# Patient Record
Sex: MALE | Race: WHITE | Employment: OTHER | URBAN - METROPOLITAN AREA
[De-identification: names, ages, dates, MRNs, and addresses within clinical notes are randomized per-mention and may not be internally consistent; named-entity substitution may affect disease eponyms.]

---

## 2019-05-14 ENCOUNTER — HOSPITAL ENCOUNTER (OUTPATIENT)
Dept: GENERAL RADIOLOGY | Facility: HOSPITAL | Age: 69
Discharge: HOME | End: 2019-05-14
Attending: INTERNAL MEDICINE
Payer: MEDICARE

## 2019-05-14 DIAGNOSIS — I10 ESSENTIAL HYPERTENSION: ICD-10-CM

## 2019-05-14 DIAGNOSIS — F03.90 DEMENTIA WITHOUT BEHAVIORAL DISTURBANCE, UNSPECIFIED DEMENTIA TYPE (HCC): ICD-10-CM

## 2019-05-14 DIAGNOSIS — R13.10 DYSPHAGIA, UNSPECIFIED TYPE: ICD-10-CM

## 2019-05-14 PROCEDURE — 92611 MOTION FLUOROSCOPY/SWALLOW: CPT

## 2019-05-14 PROCEDURE — 74230 X-RAY XM SWLNG FUNCJ C+: CPT | Performed by: INTERNAL MEDICINE

## 2019-05-14 NOTE — PROGRESS NOTES
ADULT VIDEOFLUOROSCOPIC SWALLOWING STUDY    Admission Date: 5/14/2019  Evaluation Date: 05/14/19  Radiologist: Adela Barron   Diet Recommendations - Solids: Regular  Diet Recommendations - Liquid:  Thin    Further Follow-up:        Compensatory traits. During the interview, the patient answered questions after a delay and with very brief answers. There was reduced eye contact. CARMELINA did not report any acute change in mental status, just ongoing memory issues.   He is a slow eater and always has b rate;Small bites and sips  Effectiveness: Yes     HARD SOLID  Oral Phase of Swallow (VFSS - Hard Solid): Impaired  Bolus Retrieval (VFSS - Hard Solid): Intact  Bilabial Seal (VFSS - Hard Solid): Intact  Bolus Formation (VFSS - Hard Solid):  Impaired  Bolus COMMUNICATION  Reviewed results with Radiologist; agreement verbalized.     Patient Experiencing Pain: No                FOLLOW UP  Treatment Plan/Recommendations: (consider OP cognitive eval if felt not to be at baseline)     Thank you for your referral.

## 2019-07-25 PROBLEM — Z85.038 PERSONAL HISTORY OF COLON CANCER: Status: ACTIVE | Noted: 2019-07-25

## 2019-07-25 PROBLEM — Z86.73 HISTORY OF STROKE IN ADULTHOOD: Status: ACTIVE | Noted: 2019-07-25

## 2019-07-25 PROBLEM — Z90.49: Status: ACTIVE | Noted: 2019-07-25

## 2019-09-09 PROCEDURE — 88305 TISSUE EXAM BY PATHOLOGIST: CPT | Performed by: INTERNAL MEDICINE

## 2020-08-16 ENCOUNTER — APPOINTMENT (OUTPATIENT)
Dept: ULTRASOUND IMAGING | Age: 70
DRG: 629 | End: 2020-08-16
Attending: EMERGENCY MEDICINE

## 2020-08-16 ENCOUNTER — HOSPITAL ENCOUNTER (INPATIENT)
Age: 70
LOS: 9 days | Discharge: NURSING, REHAB OR LONG-TERM CARE | DRG: 629 | End: 2020-08-25
Attending: EMERGENCY MEDICINE | Admitting: INTERNAL MEDICINE

## 2020-08-16 ENCOUNTER — APPOINTMENT (OUTPATIENT)
Dept: GENERAL RADIOLOGY | Age: 70
DRG: 629 | End: 2020-08-16
Attending: EMERGENCY MEDICINE

## 2020-08-16 DIAGNOSIS — T14.8XXA WOUND INFECTION: Primary | ICD-10-CM

## 2020-08-16 DIAGNOSIS — L08.9 WOUND INFECTION: Primary | ICD-10-CM

## 2020-08-16 DIAGNOSIS — S91.302A OPEN WOUND OF LEFT FOOT, INITIAL ENCOUNTER: ICD-10-CM

## 2020-08-16 PROBLEM — I10 ESSENTIAL HYPERTENSION: Status: ACTIVE | Noted: 2020-08-16

## 2020-08-16 PROBLEM — F03.90 DEMENTIA (CMD): Status: ACTIVE | Noted: 2020-08-16

## 2020-08-16 PROBLEM — N40.0 BPH (BENIGN PROSTATIC HYPERPLASIA): Status: ACTIVE | Noted: 2020-08-16

## 2020-08-16 LAB
ALBUMIN SERPL-MCNC: 3.1 G/DL (ref 3.6–5.1)
ALBUMIN/GLOB SERPL: 0.8 {RATIO} (ref 1–2.4)
ALP SERPL-CCNC: 66 UNITS/L (ref 45–117)
ALT SERPL-CCNC: 17 UNITS/L
ANION GAP SERPL CALC-SCNC: 7 MMOL/L (ref 10–20)
APTT PPP: 28 SEC (ref 22–32)
AST SERPL-CCNC: 13 UNITS/L
BASOPHILS # BLD: 0.1 K/MCL (ref 0–0.3)
BASOPHILS NFR BLD: 1 %
BILIRUB SERPL-MCNC: 0.3 MG/DL (ref 0.2–1)
BUN SERPL-MCNC: 22 MG/DL (ref 6–20)
BUN/CREAT SERPL: 21 (ref 7–25)
CALCIUM SERPL-MCNC: 8.9 MG/DL (ref 8.4–10.2)
CHLORIDE SERPL-SCNC: 110 MMOL/L (ref 98–107)
CO2 SERPL-SCNC: 30 MMOL/L (ref 21–32)
CREAT SERPL-MCNC: 1.03 MG/DL (ref 0.67–1.17)
DIFFERENTIAL METHOD BLD: ABNORMAL
EOSINOPHIL # BLD: 0.6 K/MCL (ref 0.1–0.5)
EOSINOPHIL NFR BLD: 8 %
ERYTHROCYTE [DISTWIDTH] IN BLOOD: 13.9 % (ref 11–15)
GLOBULIN SER-MCNC: 3.8 G/DL (ref 2–4)
GLUCOSE SERPL-MCNC: 85 MG/DL (ref 65–99)
HCT VFR BLD CALC: 36.6 % (ref 39–51)
HGB BLD-MCNC: 12.1 G/DL (ref 13–17)
IMM GRANULOCYTES # BLD AUTO: 0 K/MCL (ref 0–0.2)
IMM GRANULOCYTES NFR BLD: 0 %
INR PPP: 1.1
LACTATE BLDV-MCNC: 1.2 MMOL/L
LYMPHOCYTES # BLD: 2.7 K/MCL (ref 1–4)
LYMPHOCYTES NFR BLD: 37 %
MAGNESIUM SERPL-MCNC: 2.2 MG/DL (ref 1.7–2.4)
MCH RBC QN AUTO: 28.7 PG (ref 26–34)
MCHC RBC AUTO-ENTMCNC: 33.1 G/DL (ref 32–36.5)
MCV RBC AUTO: 86.9 FL (ref 78–100)
MONOCYTES # BLD: 0.4 K/MCL (ref 0.3–0.9)
MONOCYTES NFR BLD: 6 %
NEUTROPHILS # BLD: 3.3 K/MCL (ref 1.8–7.7)
NEUTROPHILS NFR BLD: 48 %
NRBC BLD MANUAL-RTO: 0 /100 WBC
PLATELET # BLD: 161 K/MCL (ref 140–450)
POTASSIUM SERPL-SCNC: 3.4 MMOL/L (ref 3.4–5.1)
PROT SERPL-MCNC: 6.9 G/DL (ref 6.4–8.2)
PROTHROMBIN TIME: 12 SEC (ref 9.7–11.8)
RBC # BLD: 4.21 MIL/MCL (ref 4.5–5.9)
SODIUM SERPL-SCNC: 144 MMOL/L (ref 135–145)
WBC # BLD: 7.1 K/MCL (ref 4.2–11)

## 2020-08-16 PROCEDURE — 93971 EXTREMITY STUDY: CPT

## 2020-08-16 PROCEDURE — 99223 1ST HOSP IP/OBS HIGH 75: CPT | Performed by: INTERNAL MEDICINE

## 2020-08-16 PROCEDURE — 87040 BLOOD CULTURE FOR BACTERIA: CPT

## 2020-08-16 PROCEDURE — 85025 COMPLETE CBC W/AUTO DIFF WBC: CPT

## 2020-08-16 PROCEDURE — 10004281 HB COUNTER-STAFF TIME PER 15 MIN

## 2020-08-16 PROCEDURE — 10000002 HB ROOM CHARGE MED SURG

## 2020-08-16 PROCEDURE — 10002807 HB RX 258: Performed by: EMERGENCY MEDICINE

## 2020-08-16 PROCEDURE — 73630 X-RAY EXAM OF FOOT: CPT

## 2020-08-16 PROCEDURE — 96374 THER/PROPH/DIAG INJ IV PUSH: CPT

## 2020-08-16 PROCEDURE — 83735 ASSAY OF MAGNESIUM: CPT

## 2020-08-16 PROCEDURE — 99285 EMERGENCY DEPT VISIT HI MDM: CPT

## 2020-08-16 PROCEDURE — 83605 ASSAY OF LACTIC ACID: CPT

## 2020-08-16 PROCEDURE — 10002800 HB RX 250 W HCPCS: Performed by: EMERGENCY MEDICINE

## 2020-08-16 PROCEDURE — 85610 PROTHROMBIN TIME: CPT

## 2020-08-16 PROCEDURE — 80053 COMPREHEN METABOLIC PANEL: CPT

## 2020-08-16 PROCEDURE — 85730 THROMBOPLASTIN TIME PARTIAL: CPT

## 2020-08-16 PROCEDURE — 87205 SMEAR GRAM STAIN: CPT

## 2020-08-16 RX ORDER — TAMSULOSIN HYDROCHLORIDE 0.4 MG/1
0.4 CAPSULE ORAL
Status: DISCONTINUED | OUTPATIENT
Start: 2020-08-17 | End: 2020-08-25 | Stop reason: HOSPADM

## 2020-08-16 RX ORDER — DONEPEZIL HYDROCHLORIDE 10 MG/1
10 TABLET, FILM COATED ORAL NIGHTLY
COMMUNITY
End: 2020-08-16 | Stop reason: CLARIF

## 2020-08-16 RX ORDER — PROCHLORPERAZINE MALEATE 5 MG/1
5 TABLET ORAL EVERY 4 HOURS PRN
Status: DISCONTINUED | OUTPATIENT
Start: 2020-08-16 | End: 2020-08-25 | Stop reason: HOSPADM

## 2020-08-16 RX ORDER — POTASSIUM CHLORIDE 20 MEQ/1
20 TABLET, EXTENDED RELEASE ORAL EVERY 4 HOURS PRN
Status: DISCONTINUED | OUTPATIENT
Start: 2020-08-16 | End: 2020-08-25 | Stop reason: HOSPADM

## 2020-08-16 RX ORDER — POTASSIUM CHLORIDE 14.9 MG/ML
20 INJECTION INTRAVENOUS EVERY 4 HOURS PRN
Status: DISCONTINUED | OUTPATIENT
Start: 2020-08-16 | End: 2020-08-25 | Stop reason: HOSPADM

## 2020-08-16 RX ORDER — ASPIRIN 81 MG/1
81 TABLET, CHEWABLE ORAL DAILY
Status: DISCONTINUED | OUTPATIENT
Start: 2020-08-17 | End: 2020-08-25 | Stop reason: HOSPADM

## 2020-08-16 RX ORDER — METOPROLOL SUCCINATE 50 MG/1
50 TABLET, EXTENDED RELEASE ORAL DAILY
COMMUNITY

## 2020-08-16 RX ORDER — ASPIRIN 81 MG/1
81 TABLET, CHEWABLE ORAL DAILY
COMMUNITY

## 2020-08-16 RX ORDER — ONDANSETRON 2 MG/ML
4 INJECTION INTRAMUSCULAR; INTRAVENOUS 2 TIMES DAILY PRN
Status: DISCONTINUED | OUTPATIENT
Start: 2020-08-16 | End: 2020-08-20 | Stop reason: SDUPTHER

## 2020-08-16 RX ORDER — MAGNESIUM SULFATE 1 G/100ML
1 INJECTION INTRAVENOUS DAILY PRN
Status: DISCONTINUED | OUTPATIENT
Start: 2020-08-16 | End: 2020-08-25 | Stop reason: HOSPADM

## 2020-08-16 RX ORDER — AMLODIPINE BESYLATE 5 MG/1
5 TABLET ORAL DAILY
COMMUNITY
End: 2020-09-03

## 2020-08-16 RX ORDER — FUROSEMIDE 40 MG/1
40 TABLET ORAL DAILY
COMMUNITY

## 2020-08-16 RX ORDER — ACETAMINOPHEN 325 MG/1
650 TABLET ORAL EVERY 4 HOURS PRN
Status: DISCONTINUED | OUTPATIENT
Start: 2020-08-16 | End: 2020-08-25 | Stop reason: HOSPADM

## 2020-08-16 RX ORDER — METOPROLOL SUCCINATE 50 MG/1
50 TABLET, EXTENDED RELEASE ORAL DAILY
Status: DISCONTINUED | OUTPATIENT
Start: 2020-08-17 | End: 2020-08-25 | Stop reason: HOSPADM

## 2020-08-16 RX ORDER — ERGOCALCIFEROL 1.25 MG/1
1.25 CAPSULE ORAL
Status: DISCONTINUED | OUTPATIENT
Start: 2020-08-19 | End: 2020-08-25 | Stop reason: HOSPADM

## 2020-08-16 RX ORDER — POTASSIUM CHLORIDE 20 MEQ/1
40 TABLET, EXTENDED RELEASE ORAL EVERY 4 HOURS PRN
Status: DISCONTINUED | OUTPATIENT
Start: 2020-08-16 | End: 2020-08-25 | Stop reason: HOSPADM

## 2020-08-16 RX ORDER — DONEPEZIL HYDROCHLORIDE 10 MG/1
10 TABLET, FILM COATED ORAL NIGHTLY
Status: DISCONTINUED | OUTPATIENT
Start: 2020-08-17 | End: 2020-08-25 | Stop reason: HOSPADM

## 2020-08-16 RX ORDER — 0.9 % SODIUM CHLORIDE 0.9 %
2 VIAL (ML) INJECTION EVERY 12 HOURS SCHEDULED
Status: DISCONTINUED | OUTPATIENT
Start: 2020-08-17 | End: 2020-08-25 | Stop reason: HOSPADM

## 2020-08-16 RX ORDER — AMLODIPINE BESYLATE 5 MG/1
5 TABLET ORAL DAILY
Status: DISCONTINUED | OUTPATIENT
Start: 2020-08-17 | End: 2020-08-25 | Stop reason: HOSPADM

## 2020-08-16 RX ORDER — FUROSEMIDE 40 MG/1
40 TABLET ORAL DAILY
Status: DISCONTINUED | OUTPATIENT
Start: 2020-08-17 | End: 2020-08-25 | Stop reason: HOSPADM

## 2020-08-16 RX ORDER — ERGOCALCIFEROL 1.25 MG/1
1.25 CAPSULE ORAL
COMMUNITY

## 2020-08-16 RX ORDER — MULTIVIT,CALC,MINS/IRON/FOLIC 9MG-400MCG
1 TABLET ORAL DAILY
COMMUNITY

## 2020-08-16 RX ORDER — ENOXAPARIN SODIUM 100 MG/ML
40 INJECTION SUBCUTANEOUS DAILY
Status: DISCONTINUED | OUTPATIENT
Start: 2020-08-17 | End: 2020-08-25 | Stop reason: HOSPADM

## 2020-08-16 RX ORDER — POTASSIUM CHLORIDE 1.5 G/1.58G
20 POWDER, FOR SOLUTION ORAL EVERY 4 HOURS PRN
Status: DISCONTINUED | OUTPATIENT
Start: 2020-08-16 | End: 2020-08-25 | Stop reason: HOSPADM

## 2020-08-16 RX ORDER — POTASSIUM CHLORIDE 1.5 G/1.58G
40 POWDER, FOR SOLUTION ORAL EVERY 4 HOURS PRN
Status: DISCONTINUED | OUTPATIENT
Start: 2020-08-16 | End: 2020-08-25 | Stop reason: HOSPADM

## 2020-08-16 RX ORDER — TAMSULOSIN HYDROCHLORIDE 0.4 MG/1
0.4 CAPSULE ORAL
COMMUNITY

## 2020-08-16 RX ORDER — DONEPEZIL HYDROCHLORIDE 10 MG/1
10 TABLET, FILM COATED ORAL NIGHTLY
COMMUNITY

## 2020-08-16 RX ADMIN — VANCOMYCIN HYDROCHLORIDE 750 MG: 750 INJECTION, POWDER, LYOPHILIZED, FOR SOLUTION INTRAVENOUS at 20:19

## 2020-08-16 ASSESSMENT — ENCOUNTER SYMPTOMS
EYE PAIN: 0
COUGH: 0
DIZZINESS: 0
ABDOMINAL PAIN: 0
FEVER: 0
BACK PAIN: 0
AGITATION: 0

## 2020-08-16 ASSESSMENT — ACTIVITIES OF DAILY LIVING (ADL)
ADL_BEFORE_ADMISSION: INDEPENDENT
ADL_SHORT_OF_BREATH: NO
CHRONIC_PAIN_PRESENT: NO
RECENT_DECLINE_ADL: NO
ADL_SCORE: 12

## 2020-08-16 ASSESSMENT — LIFESTYLE VARIABLES
HOW OFTEN DO YOU HAVE A DRINK CONTAINING ALCOHOL: NEVER
HOW MANY STANDARD DRINKS CONTAINING ALCOHOL DO YOU HAVE ON A TYPICAL DAY: 0,1 OR 2
AUDIT-C TOTAL SCORE: 0
HOW OFTEN DO YOU HAVE 6 OR MORE DRINKS ON ONE OCCASION: NEVER
ALCOHOL_USE_STATUS: NO OR LOW RISK WITH VALIDATED TOOL

## 2020-08-16 ASSESSMENT — COGNITIVE AND FUNCTIONAL STATUS - GENERAL
ARE YOU BLIND OR DO YOU HAVE SERIOUS DIFFICULTY SEEING, EVEN WHEN WEARING GLASSES: NO
ARE YOU DEAF OR DO YOU HAVE SERIOUS DIFFICULTY  HEARING: NO

## 2020-08-16 ASSESSMENT — PAIN SCALES - GENERAL: PAINLEVEL_OUTOF10: 0

## 2020-08-16 ASSESSMENT — COLUMBIA-SUICIDE SEVERITY RATING SCALE - C-SSRS
1. WITHIN THE PAST MONTH, HAVE YOU WISHED YOU WERE DEAD OR WISHED YOU COULD GO TO SLEEP AND NOT WAKE UP?: NO
6. HAVE YOU EVER DONE ANYTHING, STARTED TO DO ANYTHING, OR PREPARED TO DO ANYTHING TO END YOUR LIFE?: NO
2. HAVE YOU ACTUALLY HAD ANY THOUGHTS OF KILLING YOURSELF?: NO
IS THE PATIENT ABLE TO COMPLETE C-SSRS: YES

## 2020-08-16 ASSESSMENT — PATIENT HEALTH QUESTIONNAIRE - PHQ9: IS PATIENT ABLE TO COMPLETE PHQ2 OR PHQ9: YES

## 2020-08-17 ENCOUNTER — ADVANCED DIRECTIVES (OUTPATIENT)
Dept: HEALTH INFORMATION MANAGEMENT | Age: 70
End: 2020-08-17

## 2020-08-17 ENCOUNTER — APPOINTMENT (OUTPATIENT)
Dept: MRI IMAGING | Age: 70
DRG: 629 | End: 2020-08-17
Attending: PODIATRIST

## 2020-08-17 LAB
ANION GAP SERPL CALC-SCNC: 6 MMOL/L (ref 10–20)
BASOPHILS # BLD: 0.1 K/MCL (ref 0–0.3)
BASOPHILS NFR BLD: 1 %
BUN SERPL-MCNC: 17 MG/DL (ref 6–20)
BUN/CREAT SERPL: 19 (ref 7–25)
CALCIUM SERPL-MCNC: 8.8 MG/DL (ref 8.4–10.2)
CHLORIDE SERPL-SCNC: 108 MMOL/L (ref 98–107)
CO2 SERPL-SCNC: 31 MMOL/L (ref 21–32)
CREAT SERPL-MCNC: 0.91 MG/DL (ref 0.67–1.17)
DIFFERENTIAL METHOD BLD: NORMAL
EOSINOPHIL # BLD: 0.5 K/MCL (ref 0.1–0.5)
EOSINOPHIL NFR BLD: 7 %
ERYTHROCYTE [DISTWIDTH] IN BLOOD: 13.7 % (ref 11–15)
ERYTHROCYTE [SEDIMENTATION RATE] IN BLOOD BY WESTERGREN METHOD: 25 MM/HR (ref 0–20)
GLUCOSE SERPL-MCNC: 89 MG/DL (ref 65–99)
HCT VFR BLD CALC: 39.3 % (ref 39–51)
HGB BLD-MCNC: 13.2 G/DL (ref 13–17)
IMM GRANULOCYTES # BLD AUTO: 0 K/MCL (ref 0–0.2)
IMM GRANULOCYTES NFR BLD: 0 %
LYMPHOCYTES # BLD: 2.1 K/MCL (ref 1–4)
LYMPHOCYTES NFR BLD: 26 %
MCH RBC QN AUTO: 28.8 PG (ref 26–34)
MCHC RBC AUTO-ENTMCNC: 33.6 G/DL (ref 32–36.5)
MCV RBC AUTO: 85.8 FL (ref 78–100)
MONOCYTES # BLD: 0.5 K/MCL (ref 0.3–0.9)
MONOCYTES NFR BLD: 6 %
NEUTROPHILS # BLD: 4.8 K/MCL (ref 1.8–7.7)
NEUTROPHILS NFR BLD: 60 %
NRBC BLD MANUAL-RTO: 0 /100 WBC
PLATELET # BLD: 160 K/MCL (ref 140–450)
POTASSIUM SERPL-SCNC: 3.4 MMOL/L (ref 3.4–5.1)
RBC # BLD: 4.58 MIL/MCL (ref 4.5–5.9)
SODIUM SERPL-SCNC: 142 MMOL/L (ref 135–145)
WBC # BLD: 8 K/MCL (ref 4.2–11)

## 2020-08-17 PROCEDURE — 99233 SBSQ HOSP IP/OBS HIGH 50: CPT | Performed by: INTERNAL MEDICINE

## 2020-08-17 PROCEDURE — 85652 RBC SED RATE AUTOMATED: CPT

## 2020-08-17 PROCEDURE — 80048 BASIC METABOLIC PNL TOTAL CA: CPT

## 2020-08-17 PROCEDURE — 10004651 HB RX, NO CHARGE ITEM: Performed by: INTERNAL MEDICINE

## 2020-08-17 PROCEDURE — 10002803 HB RX 637: Performed by: INTERNAL MEDICINE

## 2020-08-17 PROCEDURE — A9577 INJ MULTIHANCE: HCPCS | Performed by: PODIATRIST

## 2020-08-17 PROCEDURE — 10002805 HB CONTRAST AGENT: Performed by: PODIATRIST

## 2020-08-17 PROCEDURE — 10002800 HB RX 250 W HCPCS: Performed by: INTERNAL MEDICINE

## 2020-08-17 PROCEDURE — 10000002 HB ROOM CHARGE MED SURG

## 2020-08-17 PROCEDURE — 73720 MRI LWR EXTREMITY W/O&W/DYE: CPT

## 2020-08-17 PROCEDURE — 86141 C-REACTIVE PROTEIN HS: CPT

## 2020-08-17 PROCEDURE — 85025 COMPLETE CBC W/AUTO DIFF WBC: CPT

## 2020-08-17 PROCEDURE — 36415 COLL VENOUS BLD VENIPUNCTURE: CPT

## 2020-08-17 PROCEDURE — 10002807 HB RX 258: Performed by: INTERNAL MEDICINE

## 2020-08-17 RX ORDER — HYDRALAZINE HYDROCHLORIDE 20 MG/ML
20 INJECTION INTRAMUSCULAR; INTRAVENOUS EVERY 6 HOURS PRN
Status: DISCONTINUED | OUTPATIENT
Start: 2020-08-17 | End: 2020-08-20 | Stop reason: SDUPTHER

## 2020-08-17 RX ADMIN — VANCOMYCIN HYDROCHLORIDE 750 MG: 750 INJECTION, POWDER, LYOPHILIZED, FOR SOLUTION INTRAVENOUS at 20:55

## 2020-08-17 RX ADMIN — AMLODIPINE BESYLATE 5 MG: 5 TABLET ORAL at 09:48

## 2020-08-17 RX ADMIN — ENOXAPARIN SODIUM 40 MG: 40 INJECTION SUBCUTANEOUS at 09:48

## 2020-08-17 RX ADMIN — HYDRALAZINE HYDROCHLORIDE 20 MG: 20 INJECTION INTRAMUSCULAR; INTRAVENOUS at 17:41

## 2020-08-17 RX ADMIN — ASPIRIN 81 MG 81 MG: 81 TABLET ORAL at 09:50

## 2020-08-17 RX ADMIN — SODIUM CHLORIDE, PRESERVATIVE FREE 2 ML: 5 INJECTION INTRAVENOUS at 20:51

## 2020-08-17 RX ADMIN — GADOBENATE DIMEGLUMINE 10 ML: 529 INJECTION, SOLUTION INTRAVENOUS at 14:00

## 2020-08-17 RX ADMIN — DONEPEZIL HYDROCHLORIDE 10 MG: 10 TABLET ORAL at 20:51

## 2020-08-17 RX ADMIN — SODIUM CHLORIDE 50 ML: 0.9 INJECTION, SOLUTION INTRAVENOUS at 10:37

## 2020-08-17 RX ADMIN — VANCOMYCIN HYDROCHLORIDE 750 MG: 750 INJECTION, POWDER, LYOPHILIZED, FOR SOLUTION INTRAVENOUS at 10:40

## 2020-08-17 RX ADMIN — TAMSULOSIN HYDROCHLORIDE 0.4 MG: 0.4 CAPSULE ORAL at 09:47

## 2020-08-17 RX ADMIN — FUROSEMIDE 40 MG: 40 TABLET ORAL at 09:47

## 2020-08-17 RX ADMIN — SODIUM CHLORIDE, PRESERVATIVE FREE 2 ML: 5 INJECTION INTRAVENOUS at 09:50

## 2020-08-17 RX ADMIN — METOPROLOL SUCCINATE 50 MG: 50 TABLET, EXTENDED RELEASE ORAL at 09:47

## 2020-08-17 ASSESSMENT — ENCOUNTER SYMPTOMS
WOUND: 1
CONSTIPATION: 0
SHORTNESS OF BREATH: 0

## 2020-08-17 ASSESSMENT — COGNITIVE AND FUNCTIONAL STATUS - GENERAL
BECAUSE OF A PHYSICAL, MENTAL, OR EMOTIONAL CONDITION, DO YOU HAVE DIFFICULTY DOING ERRANDS ALONE: NO
BECAUSE OF A PHYSICAL, MENTAL, OR EMOTIONAL CONDITION, DO YOU HAVE SERIOUS DIFFICULTY CONCENTRATING, REMEMBERING OR MAKING DECISIONS: NO
DO YOU HAVE DIFFICULTY DRESSING OR BATHING: NO
DO YOU HAVE SERIOUS DIFFICULTY WALKING OR CLIMBING STAIRS: YES

## 2020-08-17 ASSESSMENT — PAIN SCALES - GENERAL
PAINLEVEL_OUTOF10: 0

## 2020-08-18 ENCOUNTER — APPOINTMENT (OUTPATIENT)
Dept: ULTRASOUND IMAGING | Age: 70
DRG: 629 | End: 2020-08-18
Attending: PODIATRIST

## 2020-08-18 LAB
ANION GAP SERPL CALC-SCNC: 7 MMOL/L (ref 10–20)
BACTERIA SPEC AEROBE CULT: ABNORMAL
BACTERIA SPEC AEROBE CULT: ABNORMAL
BUN SERPL-MCNC: 27 MG/DL (ref 6–20)
BUN/CREAT SERPL: 25 (ref 7–25)
CALCIUM SERPL-MCNC: 8.6 MG/DL (ref 8.4–10.2)
CHLORIDE SERPL-SCNC: 109 MMOL/L (ref 98–107)
CO2 SERPL-SCNC: 28 MMOL/L (ref 21–32)
CREAT SERPL-MCNC: 1.07 MG/DL (ref 0.67–1.17)
CRP SERPL HS-MCNC: 4.66 MG/L
GLUCOSE BLDC GLUCOMTR-MCNC: 107 MG/DL (ref 70–99)
GLUCOSE SERPL-MCNC: 111 MG/DL (ref 65–99)
GRAM STN SPEC: ABNORMAL
MAGNESIUM SERPL-MCNC: 2.1 MG/DL (ref 1.7–2.4)
POTASSIUM SERPL-SCNC: 3.4 MMOL/L (ref 3.4–5.1)
POTASSIUM SERPL-SCNC: 3.5 MMOL/L (ref 3.4–5.1)
REPORT STATUS (RPT): ABNORMAL
SODIUM SERPL-SCNC: 141 MMOL/L (ref 135–145)
SPECIMEN SOURCE: ABNORMAL
VANCOMYCIN TROUGH SERPL-MCNC: 9.2 MCG/ML (ref 10–20)

## 2020-08-18 PROCEDURE — 93925 LOWER EXTREMITY STUDY: CPT

## 2020-08-18 PROCEDURE — 99233 SBSQ HOSP IP/OBS HIGH 50: CPT | Performed by: INTERNAL MEDICINE

## 2020-08-18 PROCEDURE — 10002801 HB RX 250 W/O HCPCS: Performed by: EMERGENCY MEDICINE

## 2020-08-18 PROCEDURE — 13003289 HB OXYGEN THERAPY DAILY

## 2020-08-18 PROCEDURE — 10002803 HB RX 637: Performed by: INTERNAL MEDICINE

## 2020-08-18 PROCEDURE — 83735 ASSAY OF MAGNESIUM: CPT

## 2020-08-18 PROCEDURE — 10004651 HB RX, NO CHARGE ITEM: Performed by: INTERNAL MEDICINE

## 2020-08-18 PROCEDURE — 10002807 HB RX 258: Performed by: INTERNAL MEDICINE

## 2020-08-18 PROCEDURE — 93005 ELECTROCARDIOGRAM TRACING: CPT | Performed by: EMERGENCY MEDICINE

## 2020-08-18 PROCEDURE — 36415 COLL VENOUS BLD VENIPUNCTURE: CPT

## 2020-08-18 PROCEDURE — 84132 ASSAY OF SERUM POTASSIUM: CPT

## 2020-08-18 PROCEDURE — 10002800 HB RX 250 W HCPCS: Performed by: INTERNAL MEDICINE

## 2020-08-18 PROCEDURE — 10002803 HB RX 637

## 2020-08-18 PROCEDURE — 97161 PT EVAL LOW COMPLEX 20 MIN: CPT | Performed by: PHYSICAL THERAPIST

## 2020-08-18 PROCEDURE — 80202 ASSAY OF VANCOMYCIN: CPT

## 2020-08-18 PROCEDURE — 80048 BASIC METABOLIC PNL TOTAL CA: CPT

## 2020-08-18 PROCEDURE — 10000002 HB ROOM CHARGE MED SURG

## 2020-08-18 PROCEDURE — 97530 THERAPEUTIC ACTIVITIES: CPT | Performed by: PHYSICAL THERAPIST

## 2020-08-18 PROCEDURE — 10004281 HB COUNTER-STAFF TIME PER 15 MIN

## 2020-08-18 RX ORDER — NITROGLYCERIN 0.4 MG/1
TABLET SUBLINGUAL
Status: DISCONTINUED
Start: 2020-08-18 | End: 2020-08-19 | Stop reason: WASHOUT

## 2020-08-18 RX ORDER — METOPROLOL TARTRATE 1 MG/ML
2.5 INJECTION, SOLUTION INTRAVENOUS ONCE
Status: COMPLETED | OUTPATIENT
Start: 2020-08-18 | End: 2020-08-18

## 2020-08-18 RX ORDER — METOPROLOL TARTRATE 1 MG/ML
INJECTION, SOLUTION INTRAVENOUS
Status: DISPENSED
Start: 2020-08-18 | End: 2020-08-19

## 2020-08-18 RX ADMIN — METOPROLOL TARTRATE 2.5 MG: 5 INJECTION INTRAVENOUS at 20:36

## 2020-08-18 RX ADMIN — VANCOMYCIN HYDROCHLORIDE 1000 MG: 1 INJECTION, POWDER, LYOPHILIZED, FOR SOLUTION INTRAVENOUS at 11:45

## 2020-08-18 RX ADMIN — DONEPEZIL HYDROCHLORIDE 10 MG: 10 TABLET ORAL at 20:16

## 2020-08-18 RX ADMIN — AMLODIPINE BESYLATE 5 MG: 5 TABLET ORAL at 09:13

## 2020-08-18 RX ADMIN — FUROSEMIDE 40 MG: 40 TABLET ORAL at 09:13

## 2020-08-18 RX ADMIN — HYDRALAZINE HYDROCHLORIDE 20 MG: 20 INJECTION INTRAMUSCULAR; INTRAVENOUS at 01:12

## 2020-08-18 RX ADMIN — POTASSIUM CHLORIDE 20 MEQ: 20 TABLET, EXTENDED RELEASE ORAL at 09:13

## 2020-08-18 RX ADMIN — SODIUM CHLORIDE, PRESERVATIVE FREE 2 ML: 5 INJECTION INTRAVENOUS at 20:16

## 2020-08-18 RX ADMIN — METOPROLOL SUCCINATE 50 MG: 50 TABLET, EXTENDED RELEASE ORAL at 09:13

## 2020-08-18 RX ADMIN — ASPIRIN 81 MG 81 MG: 81 TABLET ORAL at 09:13

## 2020-08-18 RX ADMIN — ENOXAPARIN SODIUM 40 MG: 40 INJECTION SUBCUTANEOUS at 09:13

## 2020-08-18 RX ADMIN — ACETAMINOPHEN 650 MG: 325 TABLET ORAL at 20:16

## 2020-08-18 RX ADMIN — SODIUM CHLORIDE, PRESERVATIVE FREE 2 ML: 5 INJECTION INTRAVENOUS at 09:13

## 2020-08-18 RX ADMIN — TAMSULOSIN HYDROCHLORIDE 0.4 MG: 0.4 CAPSULE ORAL at 09:13

## 2020-08-18 RX ADMIN — VANCOMYCIN HYDROCHLORIDE 1000 MG: 1 INJECTION, POWDER, LYOPHILIZED, FOR SOLUTION INTRAVENOUS at 23:18

## 2020-08-18 RX ADMIN — METOPROLOL TARTRATE 2.5 MG: 5 INJECTION INTRAVENOUS at 20:45

## 2020-08-18 RX ADMIN — POTASSIUM CHLORIDE 40 MEQ: 20 TABLET, EXTENDED RELEASE ORAL at 22:48

## 2020-08-18 ASSESSMENT — ACTIVITIES OF DAILY LIVING (ADL): PRIOR_ADL_TOILETING: MODIFIED INDEPENDENT

## 2020-08-18 ASSESSMENT — PAIN SCALES - GENERAL
PAINLEVEL_OUTOF10: 0
PAINLEVEL_OUTOF10: 4
PAINLEVEL_OUTOF10: 2
PAINLEVEL_OUTOF10: 0
PAINLEVEL_OUTOF10: 4
PAINLEVEL_OUTOF10: 2

## 2020-08-18 ASSESSMENT — COGNITIVE AND FUNCTIONAL STATUS - GENERAL
BASIC_MOBILITY_RAW_SCORE: 17
BASIC_MOBILITY_CONVERTED_SCORE: 39.67

## 2020-08-18 ASSESSMENT — ENCOUNTER SYMPTOMS
CONSTIPATION: 0
SHORTNESS OF BREATH: 0
WOUND: 1

## 2020-08-19 ENCOUNTER — APPOINTMENT (OUTPATIENT)
Dept: CARDIOLOGY | Age: 70
DRG: 629 | End: 2020-08-19
Attending: INTERNAL MEDICINE

## 2020-08-19 LAB
ATRIAL RATE (BPM): 57
POTASSIUM SERPL-SCNC: 3.7 MMOL/L (ref 3.4–5.1)
POTASSIUM SERPL-SCNC: 4.4 MMOL/L (ref 3.4–5.1)
QRS-INTERVAL (MSEC): 126
QT-INTERVAL (MSEC): 330
QTC: 528
R AXIS (DEGREES): -70
REPORT TEXT: NORMAL
SARS-COV-2 RNA RESP QL NAA+PROBE: NOT DETECTED
SERVICE CMNT-IMP: NORMAL
SPECIMEN SOURCE: NORMAL
T AXIS (DEGREES): 111
VENTRICULAR RATE EKG/MIN (BPM): 154

## 2020-08-19 PROCEDURE — 10002800 HB RX 250 W HCPCS: Performed by: INTERNAL MEDICINE

## 2020-08-19 PROCEDURE — 10002807 HB RX 258: Performed by: INTERNAL MEDICINE

## 2020-08-19 PROCEDURE — 10002803 HB RX 637: Performed by: INTERNAL MEDICINE

## 2020-08-19 PROCEDURE — 93306 TTE W/DOPPLER COMPLETE: CPT

## 2020-08-19 PROCEDURE — 10004651 HB RX, NO CHARGE ITEM: Performed by: INTERNAL MEDICINE

## 2020-08-19 PROCEDURE — 10000002 HB ROOM CHARGE MED SURG

## 2020-08-19 PROCEDURE — 97530 THERAPEUTIC ACTIVITIES: CPT

## 2020-08-19 PROCEDURE — 93306 TTE W/DOPPLER COMPLETE: CPT | Performed by: INTERNAL MEDICINE

## 2020-08-19 PROCEDURE — 97166 OT EVAL MOD COMPLEX 45 MIN: CPT

## 2020-08-19 PROCEDURE — 84132 ASSAY OF SERUM POTASSIUM: CPT

## 2020-08-19 PROCEDURE — 99221 1ST HOSP IP/OBS SF/LOW 40: CPT | Performed by: INTERNAL MEDICINE

## 2020-08-19 PROCEDURE — 99233 SBSQ HOSP IP/OBS HIGH 50: CPT | Performed by: INTERNAL MEDICINE

## 2020-08-19 PROCEDURE — 36415 COLL VENOUS BLD VENIPUNCTURE: CPT

## 2020-08-19 PROCEDURE — 87635 SARS-COV-2 COVID-19 AMP PRB: CPT

## 2020-08-19 PROCEDURE — 13003289 HB OXYGEN THERAPY DAILY

## 2020-08-19 RX ADMIN — FUROSEMIDE 40 MG: 40 TABLET ORAL at 10:12

## 2020-08-19 RX ADMIN — HYDRALAZINE HYDROCHLORIDE 20 MG: 20 INJECTION INTRAMUSCULAR; INTRAVENOUS at 23:08

## 2020-08-19 RX ADMIN — METOPROLOL SUCCINATE 50 MG: 50 TABLET, EXTENDED RELEASE ORAL at 10:12

## 2020-08-19 RX ADMIN — TAMSULOSIN HYDROCHLORIDE 0.4 MG: 0.4 CAPSULE ORAL at 10:12

## 2020-08-19 RX ADMIN — DONEPEZIL HYDROCHLORIDE 10 MG: 10 TABLET ORAL at 20:57

## 2020-08-19 RX ADMIN — ERGOCALCIFEROL 1.25 MG: 1.25 CAPSULE ORAL at 10:12

## 2020-08-19 RX ADMIN — AMLODIPINE BESYLATE 5 MG: 5 TABLET ORAL at 10:12

## 2020-08-19 RX ADMIN — VANCOMYCIN HYDROCHLORIDE 1000 MG: 1 INJECTION, POWDER, LYOPHILIZED, FOR SOLUTION INTRAVENOUS at 12:36

## 2020-08-19 RX ADMIN — POTASSIUM CHLORIDE 20 MEQ: 20 TABLET, EXTENDED RELEASE ORAL at 05:14

## 2020-08-19 RX ADMIN — ASPIRIN 81 MG 81 MG: 81 TABLET ORAL at 10:12

## 2020-08-19 RX ADMIN — SODIUM CHLORIDE, PRESERVATIVE FREE 2 ML: 5 INJECTION INTRAVENOUS at 20:58

## 2020-08-19 RX ADMIN — SODIUM CHLORIDE, PRESERVATIVE FREE 2 ML: 5 INJECTION INTRAVENOUS at 10:38

## 2020-08-19 RX ADMIN — ENOXAPARIN SODIUM 40 MG: 40 INJECTION SUBCUTANEOUS at 10:12

## 2020-08-19 ASSESSMENT — COGNITIVE AND FUNCTIONAL STATUS - GENERAL
HELP NEEDED FOR TOILETING: A LITTLE
APPLIED_COGNITIVE_CONVERTED_SCORE: 39.77
TAKING CARE OF COMPLICATED TASKS: A LOT
REMEMBERING TO TAKE MEDICATION: A LITTLE
BASIC_MOBILITY_CONVERTED_SCORE: 28.13
HELP NEEDED FOR PERSONAL GROOMING: A LITTLE
REMEMBERING 5 ERRANDS WITH NO LIST: A LITTLE
APPLIED_COGNITIVE_RAW_SCORE: 19
HELP NEEDED DRESSING REGULAR LOWER BODY CLOTHING: A LITTLE
REMEMBERING WHERE THINGS ARE: A LITTLE
BASIC_MOBILITY_RAW_SCORE: 10
DAILY_ACTIVITY_RAW_SCORE: 19
DAILY_ACTIVITY_CONVERTED_SCORE: 40.22
HELP NEEDED DRESSING REGULAR UPPER BODY CLOTHING: A LITTLE
HELP NEEDED FOR BATHING: A LITTLE

## 2020-08-19 ASSESSMENT — PAIN SCALES - GENERAL
PAINLEVEL_OUTOF10: 0

## 2020-08-19 ASSESSMENT — ENCOUNTER SYMPTOMS
SHORTNESS OF BREATH: 0
CONSTIPATION: 0
WOUND: 1

## 2020-08-19 ASSESSMENT — ACTIVITIES OF DAILY LIVING (ADL): PRIOR_ADL: INDEPENDENT

## 2020-08-20 ENCOUNTER — ANESTHESIA EVENT (OUTPATIENT)
Dept: SURGERY | Age: 70
DRG: 629 | End: 2020-08-20

## 2020-08-20 ENCOUNTER — ANESTHESIA (OUTPATIENT)
Dept: SURGERY | Age: 70
DRG: 629 | End: 2020-08-20

## 2020-08-20 PROBLEM — I47.10 SVT (SUPRAVENTRICULAR TACHYCARDIA): Status: ACTIVE | Noted: 2020-08-20

## 2020-08-20 LAB
CREAT SERPL-MCNC: 0.79 MG/DL (ref 0.67–1.17)
VANCOMYCIN TROUGH SERPL-MCNC: 14.8 MCG/ML (ref 10–20)

## 2020-08-20 PROCEDURE — 10002016 HB COUNTER INCENTIVE SPIROMETRY

## 2020-08-20 PROCEDURE — 10002800 HB RX 250 W HCPCS: Performed by: INTERNAL MEDICINE

## 2020-08-20 PROCEDURE — 10002803 HB RX 637: Performed by: INTERNAL MEDICINE

## 2020-08-20 PROCEDURE — 10002807 HB RX 258: Performed by: GENERAL ACUTE CARE HOSPITAL

## 2020-08-20 PROCEDURE — 13000114 HB ORTHO BASIC CASE S/U + 1ST 15 MIN: Performed by: PODIATRIST

## 2020-08-20 PROCEDURE — 0QBP0ZX EXCISION OF LEFT METATARSAL, OPEN APPROACH, DIAGNOSTIC: ICD-10-PCS | Performed by: PODIATRIST

## 2020-08-20 PROCEDURE — 99232 SBSQ HOSP IP/OBS MODERATE 35: CPT | Performed by: INTERNAL MEDICINE

## 2020-08-20 PROCEDURE — 13000115 HB ORTHO BASIC CASE EA ADD MINUTE: Performed by: PODIATRIST

## 2020-08-20 PROCEDURE — 80202 ASSAY OF VANCOMYCIN: CPT

## 2020-08-20 PROCEDURE — 88311 DECALCIFY TISSUE: CPT

## 2020-08-20 PROCEDURE — 13001086 HB INCENTIVE SPIROMETER W INSTRUCT

## 2020-08-20 PROCEDURE — 10002801 HB RX 250 W/O HCPCS: Performed by: PODIATRIST

## 2020-08-20 PROCEDURE — 10004451 HB PACU RECOVERY 1ST 30 MINUTES: Performed by: PODIATRIST

## 2020-08-20 PROCEDURE — 88304 TISSUE EXAM BY PATHOLOGIST: CPT

## 2020-08-20 PROCEDURE — 99233 SBSQ HOSP IP/OBS HIGH 50: CPT | Performed by: INTERNAL MEDICINE

## 2020-08-20 PROCEDURE — 10002807 HB RX 258: Performed by: INTERNAL MEDICINE

## 2020-08-20 PROCEDURE — 87077 CULTURE AEROBIC IDENTIFY: CPT

## 2020-08-20 PROCEDURE — 82565 ASSAY OF CREATININE: CPT

## 2020-08-20 PROCEDURE — 87186 SC STD MICRODIL/AGAR DIL: CPT

## 2020-08-20 PROCEDURE — 13003289 HB OXYGEN THERAPY DAILY

## 2020-08-20 PROCEDURE — 36415 COLL VENOUS BLD VENIPUNCTURE: CPT

## 2020-08-20 PROCEDURE — 10004651 HB RX, NO CHARGE ITEM: Performed by: INTERNAL MEDICINE

## 2020-08-20 PROCEDURE — 13000007 HB ANESTHESIA MAC EA ADD MINUTE: Performed by: PODIATRIST

## 2020-08-20 PROCEDURE — 87070 CULTURE OTHR SPECIMN AEROBIC: CPT

## 2020-08-20 PROCEDURE — 10000002 HB ROOM CHARGE MED SURG

## 2020-08-20 PROCEDURE — 10004281 HB COUNTER-STAFF TIME PER 15 MIN

## 2020-08-20 PROCEDURE — 10002803 HB RX 637: Performed by: PODIATRIST

## 2020-08-20 PROCEDURE — 88305 TISSUE EXAM BY PATHOLOGIST: CPT

## 2020-08-20 PROCEDURE — 13000006 HB ANESTHESIA MAC S/U + 1ST 15 MIN: Performed by: PODIATRIST

## 2020-08-20 PROCEDURE — 10006023 HB SUPPLY 272: Performed by: PODIATRIST

## 2020-08-20 PROCEDURE — 10002800 HB RX 250 W HCPCS: Performed by: GENERAL ACUTE CARE HOSPITAL

## 2020-08-20 PROCEDURE — 10004452 HB PACU ADDL 30 MINUTES: Performed by: PODIATRIST

## 2020-08-20 RX ORDER — SODIUM CHLORIDE 9 MG/ML
INJECTION, SOLUTION INTRAVENOUS CONTINUOUS
Status: DISCONTINUED | OUTPATIENT
Start: 2020-08-20 | End: 2020-08-20 | Stop reason: HOSPADM

## 2020-08-20 RX ORDER — ALBUTEROL SULFATE 2.5 MG/3ML
5 SOLUTION RESPIRATORY (INHALATION) ONCE
Status: DISCONTINUED | OUTPATIENT
Start: 2020-08-20 | End: 2020-08-20 | Stop reason: HOSPADM

## 2020-08-20 RX ORDER — HUMAN INSULIN 100 [IU]/ML
INJECTION, SOLUTION SUBCUTANEOUS
Status: DISCONTINUED | OUTPATIENT
Start: 2020-08-20 | End: 2020-08-20 | Stop reason: HOSPADM

## 2020-08-20 RX ORDER — DEXTROSE MONOHYDRATE 25 G/50ML
25 INJECTION, SOLUTION INTRAVENOUS PRN
Status: DISCONTINUED | OUTPATIENT
Start: 2020-08-20 | End: 2020-08-20 | Stop reason: HOSPADM

## 2020-08-20 RX ORDER — DEXAMETHASONE SODIUM PHOSPHATE 4 MG/ML
4 INJECTION, SOLUTION INTRA-ARTICULAR; INTRALESIONAL; INTRAMUSCULAR; INTRAVENOUS; SOFT TISSUE
Status: DISCONTINUED | OUTPATIENT
Start: 2020-08-20 | End: 2020-08-20 | Stop reason: HOSPADM

## 2020-08-20 RX ORDER — HYDRALAZINE HYDROCHLORIDE 20 MG/ML
5 INJECTION INTRAMUSCULAR; INTRAVENOUS EVERY 10 MIN PRN
Status: DISCONTINUED | OUTPATIENT
Start: 2020-08-20 | End: 2020-08-20 | Stop reason: HOSPADM

## 2020-08-20 RX ORDER — MIDAZOLAM HYDROCHLORIDE 1 MG/ML
INJECTION, SOLUTION INTRAMUSCULAR; INTRAVENOUS PRN
Status: DISCONTINUED | OUTPATIENT
Start: 2020-08-20 | End: 2020-08-20

## 2020-08-20 RX ORDER — HYDROCODONE BITARTRATE AND ACETAMINOPHEN 5; 325 MG/1; MG/1
1 TABLET ORAL EVERY 4 HOURS PRN
Status: DISCONTINUED | OUTPATIENT
Start: 2020-08-20 | End: 2020-08-25 | Stop reason: HOSPADM

## 2020-08-20 RX ORDER — OXYCODONE HYDROCHLORIDE 5 MG/1
10 TABLET ORAL EVERY 4 HOURS PRN
Status: DISCONTINUED | OUTPATIENT
Start: 2020-08-20 | End: 2020-08-25 | Stop reason: HOSPADM

## 2020-08-20 RX ORDER — LIDOCAINE HYDROCHLORIDE 10 MG/ML
5-10 INJECTION, SOLUTION INFILTRATION; PERINEURAL PRN
Status: DISCONTINUED | OUTPATIENT
Start: 2020-08-20 | End: 2020-08-20 | Stop reason: HOSPADM

## 2020-08-20 RX ORDER — SODIUM CHLORIDE, SODIUM LACTATE, POTASSIUM CHLORIDE, CALCIUM CHLORIDE 600; 310; 30; 20 MG/100ML; MG/100ML; MG/100ML; MG/100ML
INJECTION, SOLUTION INTRAVENOUS CONTINUOUS
Status: DISCONTINUED | OUTPATIENT
Start: 2020-08-20 | End: 2020-08-20 | Stop reason: HOSPADM

## 2020-08-20 RX ORDER — MIDAZOLAM HYDROCHLORIDE 1 MG/ML
2 INJECTION, SOLUTION INTRAMUSCULAR; INTRAVENOUS
Status: DISCONTINUED | OUTPATIENT
Start: 2020-08-20 | End: 2020-08-20 | Stop reason: HOSPADM

## 2020-08-20 RX ORDER — DIPHENHYDRAMINE HYDROCHLORIDE 50 MG/ML
25 INJECTION INTRAMUSCULAR; INTRAVENOUS EVERY 4 HOURS PRN
Status: DISCONTINUED | OUTPATIENT
Start: 2020-08-20 | End: 2020-08-20 | Stop reason: HOSPADM

## 2020-08-20 RX ORDER — BUPIVACAINE HYDROCHLORIDE 5 MG/ML
INJECTION, SOLUTION EPIDURAL; INTRACAUDAL PRN
Status: DISCONTINUED | OUTPATIENT
Start: 2020-08-20 | End: 2020-08-20 | Stop reason: HOSPADM

## 2020-08-20 RX ORDER — METOCLOPRAMIDE HYDROCHLORIDE 5 MG/ML
5 INJECTION INTRAMUSCULAR; INTRAVENOUS EVERY 6 HOURS PRN
Status: DISCONTINUED | OUTPATIENT
Start: 2020-08-20 | End: 2020-08-20 | Stop reason: HOSPADM

## 2020-08-20 RX ORDER — ONDANSETRON 2 MG/ML
4 INJECTION INTRAMUSCULAR; INTRAVENOUS 2 TIMES DAILY PRN
Status: DISCONTINUED | OUTPATIENT
Start: 2020-08-20 | End: 2020-08-20 | Stop reason: HOSPADM

## 2020-08-20 RX ORDER — LIDOCAINE HYDROCHLORIDE 20 MG/ML
INJECTION, SOLUTION INFILTRATION; PERINEURAL PRN
Status: DISCONTINUED | OUTPATIENT
Start: 2020-08-20 | End: 2020-08-20 | Stop reason: HOSPADM

## 2020-08-20 RX ORDER — PROPOFOL 10 MG/ML
INJECTION, EMULSION INTRAVENOUS PRN
Status: DISCONTINUED | OUTPATIENT
Start: 2020-08-20 | End: 2020-08-20

## 2020-08-20 RX ORDER — METOPROLOL SUCCINATE 25 MG/1
25 TABLET, EXTENDED RELEASE ORAL
Status: DISCONTINUED | OUTPATIENT
Start: 2020-08-20 | End: 2020-08-20 | Stop reason: HOSPADM

## 2020-08-20 RX ORDER — MAGNESIUM HYDROXIDE 1200 MG/15ML
LIQUID ORAL PRN
Status: DISCONTINUED | OUTPATIENT
Start: 2020-08-20 | End: 2020-08-20 | Stop reason: HOSPADM

## 2020-08-20 RX ADMIN — VANCOMYCIN HYDROCHLORIDE 1000 MG: 1 INJECTION, POWDER, LYOPHILIZED, FOR SOLUTION INTRAVENOUS at 13:29

## 2020-08-20 RX ADMIN — FUROSEMIDE 40 MG: 40 TABLET ORAL at 09:00

## 2020-08-20 RX ADMIN — AMLODIPINE BESYLATE 5 MG: 5 TABLET ORAL at 09:00

## 2020-08-20 RX ADMIN — SODIUM CHLORIDE, PRESERVATIVE FREE 2 ML: 5 INJECTION INTRAVENOUS at 21:47

## 2020-08-20 RX ADMIN — MIDAZOLAM HYDROCHLORIDE 2 MG: 1 INJECTION, SOLUTION INTRAMUSCULAR; INTRAVENOUS at 14:38

## 2020-08-20 RX ADMIN — OXYCODONE HYDROCHLORIDE 10 MG: 5 TABLET ORAL at 23:19

## 2020-08-20 RX ADMIN — VANCOMYCIN HYDROCHLORIDE 1000 MG: 1 INJECTION, POWDER, LYOPHILIZED, FOR SOLUTION INTRAVENOUS at 01:15

## 2020-08-20 RX ADMIN — HYDROCODONE BITARTRATE AND ACETAMINOPHEN 1 TABLET: 5; 325 TABLET ORAL at 21:45

## 2020-08-20 RX ADMIN — FENTANYL CITRATE 50 MCG: 50 INJECTION INTRAMUSCULAR; INTRAVENOUS at 14:38

## 2020-08-20 RX ADMIN — METOPROLOL SUCCINATE 50 MG: 50 TABLET, EXTENDED RELEASE ORAL at 09:00

## 2020-08-20 RX ADMIN — TAMSULOSIN HYDROCHLORIDE 0.4 MG: 0.4 CAPSULE ORAL at 09:00

## 2020-08-20 RX ADMIN — PROPOFOL 40 MG: 10 INJECTION, EMULSION INTRAVENOUS at 14:44

## 2020-08-20 RX ADMIN — SODIUM CHLORIDE, POTASSIUM CHLORIDE, SODIUM LACTATE AND CALCIUM CHLORIDE: 600; 310; 30; 20 INJECTION, SOLUTION INTRAVENOUS at 14:14

## 2020-08-20 RX ADMIN — DONEPEZIL HYDROCHLORIDE 10 MG: 10 TABLET ORAL at 21:45

## 2020-08-20 RX ADMIN — PROPOFOL 50 MCG/KG/MIN: 10 INJECTION, EMULSION INTRAVENOUS at 14:44

## 2020-08-20 RX ADMIN — SODIUM CHLORIDE, POTASSIUM CHLORIDE, SODIUM LACTATE AND CALCIUM CHLORIDE: 600; 310; 30; 20 INJECTION, SOLUTION INTRAVENOUS at 14:41

## 2020-08-20 ASSESSMENT — PAIN SCALES - GENERAL
PAINLEVEL_OUTOF10: 0
PAINLEVEL_OUTOF10: 6
PAINLEVEL_OUTOF10: 7
PAINLEVEL_OUTOF10: 0
PAINLEVEL_OUTOF10: 0
PAINLEVEL_OUTOF10: 4
PAINLEVEL_OUTOF10: 0
PAINLEVEL_OUTOF10: 0

## 2020-08-20 ASSESSMENT — ENCOUNTER SYMPTOMS
SHORTNESS OF BREATH: 0
WOUND: 1
CONSTIPATION: 0

## 2020-08-21 ENCOUNTER — APPOINTMENT (OUTPATIENT)
Dept: GENERAL RADIOLOGY | Age: 70
DRG: 629 | End: 2020-08-21
Attending: PODIATRIST

## 2020-08-21 LAB
BACTERIA BLD CULT: NORMAL
BACTERIA BLD CULT: NORMAL
CREAT SERPL-MCNC: 0.84 MG/DL (ref 0.67–1.17)
REPORT STATUS (RPT): NORMAL
REPORT STATUS (RPT): NORMAL
SPECIMEN SOURCE: NORMAL
SPECIMEN SOURCE: NORMAL

## 2020-08-21 PROCEDURE — 97530 THERAPEUTIC ACTIVITIES: CPT | Performed by: PHYSICAL THERAPIST

## 2020-08-21 PROCEDURE — 10002800 HB RX 250 W HCPCS: Performed by: INTERNAL MEDICINE

## 2020-08-21 PROCEDURE — 73630 X-RAY EXAM OF FOOT: CPT

## 2020-08-21 PROCEDURE — 99232 SBSQ HOSP IP/OBS MODERATE 35: CPT | Performed by: INTERNAL MEDICINE

## 2020-08-21 PROCEDURE — 10004651 HB RX, NO CHARGE ITEM: Performed by: INTERNAL MEDICINE

## 2020-08-21 PROCEDURE — 10000002 HB ROOM CHARGE MED SURG

## 2020-08-21 PROCEDURE — 97164 PT RE-EVAL EST PLAN CARE: CPT | Performed by: PHYSICAL THERAPIST

## 2020-08-21 PROCEDURE — 87040 BLOOD CULTURE FOR BACTERIA: CPT

## 2020-08-21 PROCEDURE — 10002803 HB RX 637: Performed by: PODIATRIST

## 2020-08-21 PROCEDURE — 10002807 HB RX 258: Performed by: INTERNAL MEDICINE

## 2020-08-21 PROCEDURE — 10002803 HB RX 637: Performed by: INTERNAL MEDICINE

## 2020-08-21 PROCEDURE — 82565 ASSAY OF CREATININE: CPT

## 2020-08-21 PROCEDURE — 36415 COLL VENOUS BLD VENIPUNCTURE: CPT

## 2020-08-21 RX ADMIN — DONEPEZIL HYDROCHLORIDE 10 MG: 10 TABLET ORAL at 20:11

## 2020-08-21 RX ADMIN — METOPROLOL SUCCINATE 50 MG: 50 TABLET, EXTENDED RELEASE ORAL at 09:44

## 2020-08-21 RX ADMIN — TAMSULOSIN HYDROCHLORIDE 0.4 MG: 0.4 CAPSULE ORAL at 09:44

## 2020-08-21 RX ADMIN — HYDROCODONE BITARTRATE AND ACETAMINOPHEN 1 TABLET: 5; 325 TABLET ORAL at 01:57

## 2020-08-21 RX ADMIN — ENOXAPARIN SODIUM 40 MG: 40 INJECTION SUBCUTANEOUS at 09:44

## 2020-08-21 RX ADMIN — SODIUM CHLORIDE, PRESERVATIVE FREE 2 ML: 5 INJECTION INTRAVENOUS at 20:11

## 2020-08-21 RX ADMIN — OXYCODONE HYDROCHLORIDE 10 MG: 5 TABLET ORAL at 20:11

## 2020-08-21 RX ADMIN — AMLODIPINE BESYLATE 5 MG: 5 TABLET ORAL at 09:44

## 2020-08-21 RX ADMIN — ASPIRIN 81 MG 81 MG: 81 TABLET ORAL at 09:44

## 2020-08-21 RX ADMIN — OXYCODONE HYDROCHLORIDE 10 MG: 5 TABLET ORAL at 09:43

## 2020-08-21 RX ADMIN — VANCOMYCIN HYDROCHLORIDE 1000 MG: 1 INJECTION, POWDER, LYOPHILIZED, FOR SOLUTION INTRAVENOUS at 14:45

## 2020-08-21 RX ADMIN — VANCOMYCIN HYDROCHLORIDE 1000 MG: 1 INJECTION, POWDER, LYOPHILIZED, FOR SOLUTION INTRAVENOUS at 00:36

## 2020-08-21 RX ADMIN — FUROSEMIDE 40 MG: 40 TABLET ORAL at 09:44

## 2020-08-21 ASSESSMENT — COGNITIVE AND FUNCTIONAL STATUS - GENERAL
BASIC_MOBILITY_RAW_SCORE: 14
BASIC_MOBILITY_CONVERTED_SCORE: 35.55

## 2020-08-21 ASSESSMENT — PAIN SCALES - GENERAL
PAINLEVEL_OUTOF10: 4
PAINLEVEL_OUTOF10: 7
PAINLEVEL_OUTOF10: 5
PAINLEVEL_OUTOF10: 8

## 2020-08-21 ASSESSMENT — ENCOUNTER SYMPTOMS
CONSTIPATION: 0
WOUND: 1
SHORTNESS OF BREATH: 0

## 2020-08-21 ASSESSMENT — ACTIVITIES OF DAILY LIVING (ADL)
PRIOR_ADL_TOILETING: MODIFIED INDEPENDENT
PRIOR_ADL: INDEPENDENT

## 2020-08-22 LAB — CREAT SERPL-MCNC: 0.94 MG/DL (ref 0.67–1.17)

## 2020-08-22 PROCEDURE — 82565 ASSAY OF CREATININE: CPT

## 2020-08-22 PROCEDURE — 10002803 HB RX 637: Performed by: INTERNAL MEDICINE

## 2020-08-22 PROCEDURE — 10002800 HB RX 250 W HCPCS: Performed by: INTERNAL MEDICINE

## 2020-08-22 PROCEDURE — 10002807 HB RX 258: Performed by: INTERNAL MEDICINE

## 2020-08-22 PROCEDURE — 10000002 HB ROOM CHARGE MED SURG

## 2020-08-22 PROCEDURE — 99232 SBSQ HOSP IP/OBS MODERATE 35: CPT | Performed by: INTERNAL MEDICINE

## 2020-08-22 PROCEDURE — 97605 NEG PRS WND THER DME<=50SQCM: CPT

## 2020-08-22 PROCEDURE — 10004651 HB RX, NO CHARGE ITEM: Performed by: INTERNAL MEDICINE

## 2020-08-22 PROCEDURE — 36415 COLL VENOUS BLD VENIPUNCTURE: CPT

## 2020-08-22 PROCEDURE — 10002803 HB RX 637: Performed by: PODIATRIST

## 2020-08-22 PROCEDURE — 97161 PT EVAL LOW COMPLEX 20 MIN: CPT

## 2020-08-22 RX ADMIN — AMLODIPINE BESYLATE 5 MG: 5 TABLET ORAL at 09:07

## 2020-08-22 RX ADMIN — VANCOMYCIN HYDROCHLORIDE 1000 MG: 1 INJECTION, POWDER, LYOPHILIZED, FOR SOLUTION INTRAVENOUS at 01:43

## 2020-08-22 RX ADMIN — METOPROLOL SUCCINATE 50 MG: 50 TABLET, EXTENDED RELEASE ORAL at 09:06

## 2020-08-22 RX ADMIN — FUROSEMIDE 40 MG: 40 TABLET ORAL at 09:06

## 2020-08-22 RX ADMIN — OXYCODONE HYDROCHLORIDE 5 MG: 5 TABLET ORAL at 13:26

## 2020-08-22 RX ADMIN — ASPIRIN 81 MG 81 MG: 81 TABLET ORAL at 09:06

## 2020-08-22 RX ADMIN — ENOXAPARIN SODIUM 40 MG: 40 INJECTION SUBCUTANEOUS at 09:07

## 2020-08-22 RX ADMIN — CEFEPIME 2000 MG: 2 INJECTION, POWDER, FOR SOLUTION INTRAVENOUS at 22:01

## 2020-08-22 RX ADMIN — HYDROCODONE BITARTRATE AND ACETAMINOPHEN 1 TABLET: 5; 325 TABLET ORAL at 12:24

## 2020-08-22 RX ADMIN — VANCOMYCIN HYDROCHLORIDE 1000 MG: 1 INJECTION, POWDER, LYOPHILIZED, FOR SOLUTION INTRAVENOUS at 13:28

## 2020-08-22 RX ADMIN — DONEPEZIL HYDROCHLORIDE 10 MG: 10 TABLET ORAL at 20:36

## 2020-08-22 RX ADMIN — CEFEPIME 2000 MG: 2 INJECTION, POWDER, FOR SOLUTION INTRAVENOUS at 16:06

## 2020-08-22 RX ADMIN — SODIUM CHLORIDE, PRESERVATIVE FREE 2 ML: 5 INJECTION INTRAVENOUS at 20:36

## 2020-08-22 RX ADMIN — TAMSULOSIN HYDROCHLORIDE 0.4 MG: 0.4 CAPSULE ORAL at 09:06

## 2020-08-22 RX ADMIN — SODIUM CHLORIDE, PRESERVATIVE FREE 2 ML: 5 INJECTION INTRAVENOUS at 09:10

## 2020-08-22 ASSESSMENT — COGNITIVE AND FUNCTIONAL STATUS - GENERAL
DO YOU HAVE DIFFICULTY DRESSING OR BATHING: YES
BECAUSE OF A PHYSICAL, MENTAL, OR EMOTIONAL CONDITION, DO YOU HAVE SERIOUS DIFFICULTY CONCENTRATING, REMEMBERING OR MAKING DECISIONS: NO
DO YOU HAVE SERIOUS DIFFICULTY WALKING OR CLIMBING STAIRS: YES
BECAUSE OF A PHYSICAL, MENTAL, OR EMOTIONAL CONDITION, DO YOU HAVE DIFFICULTY DOING ERRANDS ALONE: YES

## 2020-08-22 ASSESSMENT — PAIN SCALES - GENERAL
PAINLEVEL_OUTOF10: 3
PAINLEVEL_OUTOF10: 4
PAINLEVEL_OUTOF10: 6
PAINLEVEL_OUTOF10: 4
PAINLEVEL_OUTOF10: 6
PAINLEVEL_OUTOF10: 4
PAINLEVEL_OUTOF10: 0

## 2020-08-22 ASSESSMENT — PAIN DESCRIPTION - PAIN TYPE: TYPE: ACUTE PAIN

## 2020-08-22 ASSESSMENT — ENCOUNTER SYMPTOMS
CONSTIPATION: 0
WOUND: 1
SHORTNESS OF BREATH: 0

## 2020-08-23 LAB — VANCOMYCIN TROUGH SERPL-MCNC: 16.2 MCG/ML (ref 10–20)

## 2020-08-23 PROCEDURE — 10002803 HB RX 637: Performed by: PODIATRIST

## 2020-08-23 PROCEDURE — 10004651 HB RX, NO CHARGE ITEM: Performed by: INTERNAL MEDICINE

## 2020-08-23 PROCEDURE — 10000002 HB ROOM CHARGE MED SURG

## 2020-08-23 PROCEDURE — 36415 COLL VENOUS BLD VENIPUNCTURE: CPT

## 2020-08-23 PROCEDURE — 10002800 HB RX 250 W HCPCS: Performed by: INTERNAL MEDICINE

## 2020-08-23 PROCEDURE — 80202 ASSAY OF VANCOMYCIN: CPT

## 2020-08-23 PROCEDURE — 99232 SBSQ HOSP IP/OBS MODERATE 35: CPT | Performed by: INTERNAL MEDICINE

## 2020-08-23 PROCEDURE — 10002803 HB RX 637: Performed by: INTERNAL MEDICINE

## 2020-08-23 PROCEDURE — 10002807 HB RX 258: Performed by: INTERNAL MEDICINE

## 2020-08-23 RX ORDER — POLYETHYLENE GLYCOL 3350 17 G/17G
17 POWDER, FOR SOLUTION ORAL EVERY EVENING
Status: DISCONTINUED | OUTPATIENT
Start: 2020-08-23 | End: 2020-08-25 | Stop reason: HOSPADM

## 2020-08-23 RX ORDER — AMOXICILLIN 250 MG
1 CAPSULE ORAL 2 TIMES DAILY
Status: DISCONTINUED | OUTPATIENT
Start: 2020-08-23 | End: 2020-08-25 | Stop reason: HOSPADM

## 2020-08-23 RX ADMIN — CEFEPIME 2000 MG: 2 INJECTION, POWDER, FOR SOLUTION INTRAVENOUS at 15:18

## 2020-08-23 RX ADMIN — HYDROCODONE BITARTRATE AND ACETAMINOPHEN 1 TABLET: 5; 325 TABLET ORAL at 01:01

## 2020-08-23 RX ADMIN — HYDROCODONE BITARTRATE AND ACETAMINOPHEN 1 TABLET: 5; 325 TABLET ORAL at 16:38

## 2020-08-23 RX ADMIN — SODIUM CHLORIDE, PRESERVATIVE FREE 2 ML: 5 INJECTION INTRAVENOUS at 21:27

## 2020-08-23 RX ADMIN — CEFEPIME 2000 MG: 2 INJECTION, POWDER, FOR SOLUTION INTRAVENOUS at 06:59

## 2020-08-23 RX ADMIN — TAMSULOSIN HYDROCHLORIDE 0.4 MG: 0.4 CAPSULE ORAL at 08:20

## 2020-08-23 RX ADMIN — DOCUSATE SODIUM AND SENNOSIDES 1 TABLET: 8.6; 5 TABLET, FILM COATED ORAL at 21:27

## 2020-08-23 RX ADMIN — ASPIRIN 81 MG 81 MG: 81 TABLET ORAL at 08:20

## 2020-08-23 RX ADMIN — DOCUSATE SODIUM AND SENNOSIDES 1 TABLET: 8.6; 5 TABLET, FILM COATED ORAL at 10:00

## 2020-08-23 RX ADMIN — VANCOMYCIN HYDROCHLORIDE 1000 MG: 1 INJECTION, POWDER, LYOPHILIZED, FOR SOLUTION INTRAVENOUS at 02:59

## 2020-08-23 RX ADMIN — DONEPEZIL HYDROCHLORIDE 10 MG: 10 TABLET ORAL at 21:27

## 2020-08-23 RX ADMIN — HYDROCODONE BITARTRATE AND ACETAMINOPHEN 1 TABLET: 5; 325 TABLET ORAL at 21:32

## 2020-08-23 RX ADMIN — AMLODIPINE BESYLATE 5 MG: 5 TABLET ORAL at 08:20

## 2020-08-23 RX ADMIN — ENOXAPARIN SODIUM 40 MG: 40 INJECTION SUBCUTANEOUS at 08:21

## 2020-08-23 RX ADMIN — FUROSEMIDE 40 MG: 40 TABLET ORAL at 08:20

## 2020-08-23 RX ADMIN — CEFEPIME 2000 MG: 2 INJECTION, POWDER, FOR SOLUTION INTRAVENOUS at 21:30

## 2020-08-23 RX ADMIN — HYDROCODONE BITARTRATE AND ACETAMINOPHEN 1 TABLET: 5; 325 TABLET ORAL at 08:20

## 2020-08-23 RX ADMIN — POLYETHYLENE GLYCOL 3350 17 G: 17 POWDER, FOR SOLUTION ORAL at 17:31

## 2020-08-23 RX ADMIN — VANCOMYCIN HYDROCHLORIDE 1000 MG: 1 INJECTION, POWDER, LYOPHILIZED, FOR SOLUTION INTRAVENOUS at 13:25

## 2020-08-23 ASSESSMENT — COGNITIVE AND FUNCTIONAL STATUS - GENERAL
DO YOU HAVE SERIOUS DIFFICULTY WALKING OR CLIMBING STAIRS: YES
DO YOU HAVE DIFFICULTY DRESSING OR BATHING: NO
BECAUSE OF A PHYSICAL, MENTAL, OR EMOTIONAL CONDITION, DO YOU HAVE DIFFICULTY DOING ERRANDS ALONE: YES
BECAUSE OF A PHYSICAL, MENTAL, OR EMOTIONAL CONDITION, DO YOU HAVE SERIOUS DIFFICULTY CONCENTRATING, REMEMBERING OR MAKING DECISIONS: NO

## 2020-08-23 ASSESSMENT — ENCOUNTER SYMPTOMS
SHORTNESS OF BREATH: 0
WOUND: 1
CONSTIPATION: 0

## 2020-08-23 ASSESSMENT — PAIN SCALES - GENERAL
PAINLEVEL_OUTOF10: 4
PAINLEVEL_OUTOF10: 4
PAINLEVEL_OUTOF10: 3
PAINLEVEL_OUTOF10: 4
PAINLEVEL_OUTOF10: 3
PAINLEVEL_OUTOF10: 4
PAINLEVEL_OUTOF10: 3
PAINLEVEL_OUTOF10: 0
PAINLEVEL_OUTOF10: 4

## 2020-08-23 ASSESSMENT — PAIN SCALES - BEHAVIORAL PAIN SCALE (BPS): BPS_SCORE: 3

## 2020-08-24 LAB
ANNOTATION COMMENT IMP: ABNORMAL
ANNOTATION COMMENT IMP: ABNORMAL
BACTERIA SPEC ANAEROBE+AEROBE CULT: ABNORMAL
GRAM STN SPEC: ABNORMAL
ORGANISM: ABNORMAL
REPORT STATUS (RPT): ABNORMAL
REPORT STATUS (RPT): ABNORMAL
SPECIMEN SOURCE: ABNORMAL
SPECIMEN SOURCE: ABNORMAL

## 2020-08-24 PROCEDURE — 10002807 HB RX 258: Performed by: INTERNAL MEDICINE

## 2020-08-24 PROCEDURE — 10002800 HB RX 250 W HCPCS: Performed by: PODIATRIST

## 2020-08-24 PROCEDURE — 10002803 HB RX 637: Performed by: PODIATRIST

## 2020-08-24 PROCEDURE — 99232 SBSQ HOSP IP/OBS MODERATE 35: CPT | Performed by: INTERNAL MEDICINE

## 2020-08-24 PROCEDURE — 36573 INSJ PICC RS&I 5 YR+: CPT

## 2020-08-24 PROCEDURE — 10006023 HB SUPPLY 272

## 2020-08-24 PROCEDURE — 10002800 HB RX 250 W HCPCS: Performed by: INTERNAL MEDICINE

## 2020-08-24 PROCEDURE — 10002803 HB RX 637: Performed by: INTERNAL MEDICINE

## 2020-08-24 PROCEDURE — 97602 WOUND(S) CARE NON-SELECTIVE: CPT

## 2020-08-24 PROCEDURE — C1751 CATH, INF, PER/CENT/MIDLINE: HCPCS

## 2020-08-24 PROCEDURE — 10002803 HB RX 637

## 2020-08-24 PROCEDURE — 97605 NEG PRS WND THER DME<=50SQCM: CPT

## 2020-08-24 PROCEDURE — 10000002 HB ROOM CHARGE MED SURG

## 2020-08-24 PROCEDURE — 99233 SBSQ HOSP IP/OBS HIGH 50: CPT | Performed by: INTERNAL MEDICINE

## 2020-08-24 PROCEDURE — 10004651 HB RX, NO CHARGE ITEM: Performed by: INTERNAL MEDICINE

## 2020-08-24 RX ORDER — HYDROCODONE BITARTRATE AND ACETAMINOPHEN 5; 325 MG/1; MG/1
1 TABLET ORAL EVERY 4 HOURS PRN
Qty: 20 TABLET | Refills: 0 | Status: SHIPPED | OUTPATIENT
Start: 2020-08-24

## 2020-08-24 RX ORDER — MULTIVITAMIN,THER AND MINERALS
1 TABLET ORAL DAILY
Status: DISCONTINUED | OUTPATIENT
Start: 2020-08-24 | End: 2020-08-25 | Stop reason: HOSPADM

## 2020-08-24 RX ORDER — ACETAMINOPHEN 325 MG/1
650 TABLET ORAL EVERY 4 HOURS PRN
Status: SHIPPED | COMMUNITY
Start: 2020-08-24

## 2020-08-24 RX ORDER — ENOXAPARIN SODIUM 100 MG/ML
40 INJECTION SUBCUTANEOUS DAILY
Qty: 5.6 ML | Refills: 0 | Status: SHIPPED
Start: 2020-08-25 | End: 2020-09-08

## 2020-08-24 RX ORDER — 0.9 % SODIUM CHLORIDE 0.9 %
10 VIAL (ML) INJECTION PRN
Status: DISCONTINUED | OUTPATIENT
Start: 2020-08-24 | End: 2020-08-25 | Stop reason: HOSPADM

## 2020-08-24 RX ORDER — AMOXICILLIN 250 MG
1 CAPSULE ORAL 2 TIMES DAILY
Status: SHIPPED | COMMUNITY
Start: 2020-08-24

## 2020-08-24 RX ADMIN — TAMSULOSIN HYDROCHLORIDE 0.4 MG: 0.4 CAPSULE ORAL at 08:54

## 2020-08-24 RX ADMIN — MULTIPLE VITAMINS W/ MINERALS TAB 1 TABLET: TAB at 16:08

## 2020-08-24 RX ADMIN — DONEPEZIL HYDROCHLORIDE 10 MG: 10 TABLET ORAL at 21:33

## 2020-08-24 RX ADMIN — CEFEPIME 2000 MG: 2 INJECTION, POWDER, FOR SOLUTION INTRAVENOUS at 06:00

## 2020-08-24 RX ADMIN — METOPROLOL SUCCINATE 50 MG: 50 TABLET, EXTENDED RELEASE ORAL at 08:54

## 2020-08-24 RX ADMIN — MORPHINE SULFATE 2 MG: 2 INJECTION, SOLUTION INTRAMUSCULAR; INTRAVENOUS at 14:29

## 2020-08-24 RX ADMIN — VANCOMYCIN HYDROCHLORIDE 1000 MG: 1 INJECTION, POWDER, LYOPHILIZED, FOR SOLUTION INTRAVENOUS at 14:35

## 2020-08-24 RX ADMIN — MORPHINE SULFATE 2 MG: 2 INJECTION, SOLUTION INTRAMUSCULAR; INTRAVENOUS at 09:15

## 2020-08-24 RX ADMIN — CEFEPIME 2000 MG: 2 INJECTION, POWDER, FOR SOLUTION INTRAVENOUS at 16:10

## 2020-08-24 RX ADMIN — CEFEPIME 2000 MG: 2 INJECTION, POWDER, FOR SOLUTION INTRAVENOUS at 21:33

## 2020-08-24 RX ADMIN — ASPIRIN 81 MG 81 MG: 81 TABLET ORAL at 08:54

## 2020-08-24 RX ADMIN — SODIUM CHLORIDE, PRESERVATIVE FREE 2 ML: 5 INJECTION INTRAVENOUS at 08:54

## 2020-08-24 RX ADMIN — VANCOMYCIN HYDROCHLORIDE 1000 MG: 1 INJECTION, POWDER, LYOPHILIZED, FOR SOLUTION INTRAVENOUS at 01:55

## 2020-08-24 RX ADMIN — AMLODIPINE BESYLATE 5 MG: 5 TABLET ORAL at 08:54

## 2020-08-24 RX ADMIN — POLYETHYLENE GLYCOL 3350 17 G: 17 POWDER, FOR SOLUTION ORAL at 19:18

## 2020-08-24 RX ADMIN — ENOXAPARIN SODIUM 40 MG: 40 INJECTION SUBCUTANEOUS at 08:54

## 2020-08-24 RX ADMIN — SODIUM CHLORIDE 25 ML: 0.9 INJECTION, SOLUTION INTRAVENOUS at 14:33

## 2020-08-24 RX ADMIN — FUROSEMIDE 40 MG: 40 TABLET ORAL at 08:54

## 2020-08-24 RX ADMIN — HYDROCODONE BITARTRATE AND ACETAMINOPHEN 1 TABLET: 5; 325 TABLET ORAL at 16:08

## 2020-08-24 RX ADMIN — DOCUSATE SODIUM AND SENNOSIDES 1 TABLET: 8.6; 5 TABLET, FILM COATED ORAL at 08:54

## 2020-08-24 RX ADMIN — HYDROCODONE BITARTRATE AND ACETAMINOPHEN 1 TABLET: 5; 325 TABLET ORAL at 09:52

## 2020-08-24 ASSESSMENT — ENCOUNTER SYMPTOMS
SHORTNESS OF BREATH: 0
WOUND: 1
CONSTIPATION: 0

## 2020-08-24 ASSESSMENT — PAIN SCALES - GENERAL
PAINLEVEL_OUTOF10: 4
PAINLEVEL_OUTOF10: 3
PAINLEVEL_OUTOF10: 9
PAINLEVEL_OUTOF10: 5
PAINLEVEL_OUTOF10: 6
PAINLEVEL_OUTOF10: 8
PAINLEVEL_OUTOF10: 8
PAINLEVEL_OUTOF10: 0
PAINLEVEL_OUTOF10: 8
PAINLEVEL_OUTOF10: 0

## 2020-08-25 ENCOUNTER — APPOINTMENT (OUTPATIENT)
Dept: CARDIOLOGY | Age: 70
DRG: 629 | End: 2020-08-25
Attending: NURSE PRACTITIONER

## 2020-08-25 VITALS
OXYGEN SATURATION: 98 % | RESPIRATION RATE: 20 BRPM | TEMPERATURE: 98.8 F | HEIGHT: 74 IN | BODY MASS INDEX: 27.98 KG/M2 | HEART RATE: 64 BPM | SYSTOLIC BLOOD PRESSURE: 167 MMHG | WEIGHT: 218.03 LBS | DIASTOLIC BLOOD PRESSURE: 95 MMHG

## 2020-08-25 LAB — PATHOLOGIST NAME: NORMAL

## 2020-08-25 PROCEDURE — 10002800 HB RX 250 W HCPCS: Performed by: INTERNAL MEDICINE

## 2020-08-25 PROCEDURE — 93270 REMOTE 30 DAY ECG REV/REPORT: CPT

## 2020-08-25 PROCEDURE — 10002807 HB RX 258: Performed by: INTERNAL MEDICINE

## 2020-08-25 PROCEDURE — X1094 NO CHARGE VISIT: HCPCS | Performed by: INTERNAL MEDICINE

## 2020-08-25 PROCEDURE — 10002803 HB RX 637

## 2020-08-25 PROCEDURE — 10004651 HB RX, NO CHARGE ITEM: Performed by: INTERNAL MEDICINE

## 2020-08-25 PROCEDURE — 99239 HOSP IP/OBS DSCHRG MGMT >30: CPT | Performed by: INTERNAL MEDICINE

## 2020-08-25 PROCEDURE — 10002803 HB RX 637: Performed by: INTERNAL MEDICINE

## 2020-08-25 PROCEDURE — 93272 ECG/REVIEW INTERPRET ONLY: CPT | Performed by: INTERNAL MEDICINE

## 2020-08-25 RX ADMIN — DOCUSATE SODIUM AND SENNOSIDES 1 TABLET: 8.6; 5 TABLET, FILM COATED ORAL at 08:40

## 2020-08-25 RX ADMIN — METOPROLOL SUCCINATE 50 MG: 50 TABLET, EXTENDED RELEASE ORAL at 08:40

## 2020-08-25 RX ADMIN — VANCOMYCIN HYDROCHLORIDE 1000 MG: 1 INJECTION, POWDER, LYOPHILIZED, FOR SOLUTION INTRAVENOUS at 13:03

## 2020-08-25 RX ADMIN — TAMSULOSIN HYDROCHLORIDE 0.4 MG: 0.4 CAPSULE ORAL at 08:40

## 2020-08-25 RX ADMIN — ASPIRIN 81 MG 81 MG: 81 TABLET ORAL at 08:40

## 2020-08-25 RX ADMIN — MULTIPLE VITAMINS W/ MINERALS TAB 1 TABLET: TAB at 08:40

## 2020-08-25 RX ADMIN — VANCOMYCIN HYDROCHLORIDE 1000 MG: 1 INJECTION, POWDER, LYOPHILIZED, FOR SOLUTION INTRAVENOUS at 01:40

## 2020-08-25 RX ADMIN — AMLODIPINE BESYLATE 5 MG: 5 TABLET ORAL at 08:40

## 2020-08-25 RX ADMIN — CEFEPIME 2000 MG: 2 INJECTION, POWDER, FOR SOLUTION INTRAVENOUS at 05:54

## 2020-08-25 RX ADMIN — ENOXAPARIN SODIUM 40 MG: 40 INJECTION SUBCUTANEOUS at 08:41

## 2020-08-25 RX ADMIN — FUROSEMIDE 40 MG: 40 TABLET ORAL at 08:40

## 2020-08-25 ASSESSMENT — PAIN SCALES - GENERAL: PAINLEVEL_OUTOF10: 4

## 2020-08-27 LAB
BACTERIA BLD CULT: NORMAL
BACTERIA BLD CULT: NORMAL
REPORT STATUS (RPT): NORMAL
REPORT STATUS (RPT): NORMAL
SPECIMEN SOURCE: NORMAL
SPECIMEN SOURCE: NORMAL

## 2020-09-03 ENCOUNTER — HOSPITAL ENCOUNTER (OUTPATIENT)
Dept: WOUND CARE | Age: 70
Discharge: STILL ADMITTED OR ADMINISTRATIVE | End: 2020-09-03
Attending: PODIATRIST

## 2020-09-03 VITALS
DIASTOLIC BLOOD PRESSURE: 74 MMHG | TEMPERATURE: 97.7 F | SYSTOLIC BLOOD PRESSURE: 144 MMHG | RESPIRATION RATE: 15 BRPM | HEART RATE: 48 BPM | OXYGEN SATURATION: 98 %

## 2020-09-03 DIAGNOSIS — I87.2 VENOUS (PERIPHERAL) INSUFFICIENCY: ICD-10-CM

## 2020-09-03 DIAGNOSIS — L97.529 FOOT ULCER, LEFT, WITH UNSPECIFIED SEVERITY (CMD): Primary | ICD-10-CM

## 2020-09-03 DIAGNOSIS — S91.302D OPEN WOUND OF LEFT FOOT, SUBSEQUENT ENCOUNTER: ICD-10-CM

## 2020-09-03 DIAGNOSIS — M86.672 CHRONIC OSTEOMYELITIS OF LEFT FOOT (CMD): ICD-10-CM

## 2020-09-03 PROCEDURE — 11042 DBRDMT SUBQ TIS 1ST 20SQCM/<: CPT

## 2020-09-03 ASSESSMENT — PAIN SCALES - GENERAL: PAINLEVEL_OUTOF10: 0

## 2020-09-10 ENCOUNTER — HOSPITAL ENCOUNTER (OUTPATIENT)
Dept: WOUND CARE | Age: 70
Discharge: STILL ADMITTED OR ADMINISTRATIVE | End: 2020-09-10
Attending: PODIATRIST

## 2020-09-10 ENCOUNTER — APPOINTMENT (OUTPATIENT)
Dept: WOUND CARE | Age: 70
End: 2020-09-10
Attending: PODIATRIST

## 2020-09-10 VITALS — TEMPERATURE: 97.2 F

## 2020-09-10 DIAGNOSIS — M86.472 CHRONIC OSTEOMYELITIS WITH DRAINING SINUS, LEFT ANKLE AND FOOT (CMD): ICD-10-CM

## 2020-09-10 DIAGNOSIS — L97.524 SKIN ULCER OF LEFT FOOT WITH NECROSIS OF BONE (CMD): Primary | ICD-10-CM

## 2020-09-10 PROCEDURE — 11042 DBRDMT SUBQ TIS 1ST 20SQCM/<: CPT

## 2020-09-10 RX ORDER — ENOXAPARIN SODIUM 100 MG/ML
40 INJECTION SUBCUTANEOUS
COMMUNITY

## 2020-09-10 ASSESSMENT — PAIN SCALES - GENERAL: PAINLEVEL_OUTOF10: 2

## 2020-09-12 ENCOUNTER — TELEPHONE (OUTPATIENT)
Dept: CARDIOLOGY | Age: 70
End: 2020-09-12

## 2020-09-17 ENCOUNTER — HOSPITAL ENCOUNTER (OUTPATIENT)
Dept: WOUND CARE | Age: 70
Discharge: STILL ADMITTED OR ADMINISTRATIVE | End: 2020-09-17
Attending: PODIATRIST

## 2020-09-17 VITALS — TEMPERATURE: 97.3 F

## 2020-09-17 DIAGNOSIS — I87.2 VENOUS (PERIPHERAL) INSUFFICIENCY: ICD-10-CM

## 2020-09-17 DIAGNOSIS — M86.672 CHRONIC OSTEOMYELITIS OF LEFT FOOT (CMD): ICD-10-CM

## 2020-09-17 DIAGNOSIS — M86.472 CHRONIC OSTEOMYELITIS WITH DRAINING SINUS, LEFT ANKLE AND FOOT (CMD): ICD-10-CM

## 2020-09-17 DIAGNOSIS — L97.529 FOOT ULCER, LEFT, WITH UNSPECIFIED SEVERITY (CMD): ICD-10-CM

## 2020-09-17 DIAGNOSIS — S91.302D OPEN WOUND OF LEFT FOOT, SUBSEQUENT ENCOUNTER: ICD-10-CM

## 2020-09-17 DIAGNOSIS — L97.524 SKIN ULCER OF LEFT FOOT WITH NECROSIS OF BONE (CMD): Primary | ICD-10-CM

## 2020-09-17 PROCEDURE — 11042 DBRDMT SUBQ TIS 1ST 20SQCM/<: CPT

## 2020-09-17 ASSESSMENT — PAIN SCALES - GENERAL: PAINLEVEL_OUTOF10: 0

## 2020-09-24 ENCOUNTER — HOSPITAL ENCOUNTER (OUTPATIENT)
Dept: WOUND CARE | Age: 70
Discharge: STILL ADMITTED OR ADMINISTRATIVE | End: 2020-09-24
Attending: PODIATRIST

## 2020-09-24 VITALS — TEMPERATURE: 97.2 F

## 2020-09-24 DIAGNOSIS — I87.2 VENOUS (PERIPHERAL) INSUFFICIENCY: ICD-10-CM

## 2020-09-24 DIAGNOSIS — M86.472 CHRONIC OSTEOMYELITIS WITH DRAINING SINUS, LEFT ANKLE AND FOOT (CMD): ICD-10-CM

## 2020-09-24 DIAGNOSIS — S91.302D OPEN WOUND OF LEFT FOOT, SUBSEQUENT ENCOUNTER: ICD-10-CM

## 2020-09-24 DIAGNOSIS — L97.524 SKIN ULCER OF LEFT FOOT WITH NECROSIS OF BONE (CMD): Primary | ICD-10-CM

## 2020-09-24 DIAGNOSIS — L97.529 FOOT ULCER, LEFT, WITH UNSPECIFIED SEVERITY (CMD): ICD-10-CM

## 2020-09-24 PROCEDURE — 11042 DBRDMT SUBQ TIS 1ST 20SQCM/<: CPT

## 2020-09-24 ASSESSMENT — PAIN SCALES - GENERAL: PAINLEVEL_OUTOF10: 0

## 2020-09-28 ENCOUNTER — HOSPITAL ENCOUNTER (OUTPATIENT)
Dept: WOUND CARE | Age: 70
Discharge: STILL ADMITTED OR ADMINISTRATIVE | End: 2020-09-28
Attending: SPECIALIST

## 2020-09-28 ENCOUNTER — HOSPITAL ENCOUNTER (OUTPATIENT)
Dept: GENERAL RADIOLOGY | Age: 70
Discharge: HOME | End: 2020-09-28

## 2020-09-28 VITALS — TEMPERATURE: 98.1 F

## 2020-09-28 DIAGNOSIS — S91.302D OPEN WOUND OF LEFT FOOT, SUBSEQUENT ENCOUNTER: Primary | ICD-10-CM

## 2020-09-28 DIAGNOSIS — L97.524 SKIN ULCER OF LEFT FOOT WITH NECROSIS OF BONE (CMD): ICD-10-CM

## 2020-09-28 PROCEDURE — 73630 X-RAY EXAM OF FOOT: CPT

## 2020-09-28 PROCEDURE — 11042 DBRDMT SUBQ TIS 1ST 20SQCM/<: CPT

## 2020-09-28 ASSESSMENT — PAIN SCALES - GENERAL: PAINLEVEL_OUTOF10: 0

## 2020-10-01 ENCOUNTER — HOSPITAL ENCOUNTER (OUTPATIENT)
Dept: WOUND CARE | Age: 70
Discharge: STILL ADMITTED OR ADMINISTRATIVE | End: 2020-10-01
Attending: PODIATRIST

## 2020-10-01 VITALS — TEMPERATURE: 97.8 F

## 2020-10-01 DIAGNOSIS — L97.529 FOOT ULCER, LEFT, WITH UNSPECIFIED SEVERITY (CMD): ICD-10-CM

## 2020-10-01 DIAGNOSIS — S91.302D OPEN WOUND OF LEFT FOOT, SUBSEQUENT ENCOUNTER: Primary | ICD-10-CM

## 2020-10-01 DIAGNOSIS — L97.524 SKIN ULCER OF LEFT FOOT WITH NECROSIS OF BONE (CMD): ICD-10-CM

## 2020-10-01 DIAGNOSIS — M86.472 CHRONIC OSTEOMYELITIS WITH DRAINING SINUS, LEFT ANKLE AND FOOT (CMD): ICD-10-CM

## 2020-10-01 DIAGNOSIS — I87.2 VENOUS (PERIPHERAL) INSUFFICIENCY: ICD-10-CM

## 2020-10-01 DIAGNOSIS — M86.672 CHRONIC OSTEOMYELITIS OF LEFT FOOT (CMD): ICD-10-CM

## 2020-10-01 PROCEDURE — 11042 DBRDMT SUBQ TIS 1ST 20SQCM/<: CPT

## 2020-10-01 RX ORDER — METOPROLOL TARTRATE 50 MG/1
TABLET, FILM COATED ORAL
COMMUNITY
Start: 2020-09-19

## 2020-10-01 RX ORDER — FINASTERIDE 5 MG/1
TABLET, FILM COATED ORAL
COMMUNITY
Start: 2020-09-19

## 2020-10-01 RX ORDER — AMLODIPINE BESYLATE 10 MG/1
TABLET ORAL
COMMUNITY
Start: 2020-09-19

## 2020-10-01 RX ORDER — DOXYCYCLINE 100 MG/1
CAPSULE ORAL
COMMUNITY
Start: 2020-09-19

## 2020-10-01 ASSESSMENT — PAIN SCALES - GENERAL: PAINLEVEL_OUTOF10: 0

## 2020-10-08 ENCOUNTER — HOSPITAL ENCOUNTER (OUTPATIENT)
Dept: WOUND CARE | Age: 70
Discharge: STILL ADMITTED OR ADMINISTRATIVE | End: 2020-10-08
Attending: PODIATRIST

## 2020-10-08 VITALS — TEMPERATURE: 97.1 F

## 2020-10-08 DIAGNOSIS — L97.524 SKIN ULCER OF LEFT FOOT WITH NECROSIS OF BONE (CMD): ICD-10-CM

## 2020-10-08 DIAGNOSIS — M86.472 CHRONIC OSTEOMYELITIS WITH DRAINING SINUS, LEFT ANKLE AND FOOT (CMD): ICD-10-CM

## 2020-10-08 DIAGNOSIS — S91.302D OPEN WOUND OF LEFT FOOT, SUBSEQUENT ENCOUNTER: Primary | ICD-10-CM

## 2020-10-08 DIAGNOSIS — L97.529 FOOT ULCER, LEFT, WITH UNSPECIFIED SEVERITY (CMD): ICD-10-CM

## 2020-10-08 DIAGNOSIS — M86.672 CHRONIC OSTEOMYELITIS OF LEFT FOOT (CMD): ICD-10-CM

## 2020-10-08 DIAGNOSIS — I87.2 VENOUS (PERIPHERAL) INSUFFICIENCY: ICD-10-CM

## 2020-10-08 PROCEDURE — 11042 DBRDMT SUBQ TIS 1ST 20SQCM/<: CPT

## 2020-10-08 ASSESSMENT — PAIN SCALES - GENERAL: PAINLEVEL_OUTOF10: 0

## 2020-10-12 ENCOUNTER — APPOINTMENT (OUTPATIENT)
Dept: WOUND CARE | Age: 70
End: 2020-10-12
Attending: SPECIALIST

## 2020-10-15 ENCOUNTER — APPOINTMENT (OUTPATIENT)
Dept: WOUND CARE | Age: 70
End: 2020-10-15
Attending: PODIATRIST

## 2020-10-29 ENCOUNTER — HOSPITAL ENCOUNTER (OUTPATIENT)
Dept: WOUND CARE | Age: 70
Discharge: STILL ADMITTED OR ADMINISTRATIVE | End: 2020-10-29
Attending: PODIATRIST

## 2020-10-29 VITALS — TEMPERATURE: 98.1 F

## 2020-10-29 DIAGNOSIS — L97.529 FOOT ULCER, LEFT, WITH UNSPECIFIED SEVERITY (CMD): ICD-10-CM

## 2020-10-29 DIAGNOSIS — S91.302D OPEN WOUND OF LEFT FOOT, SUBSEQUENT ENCOUNTER: Primary | ICD-10-CM

## 2020-10-29 DIAGNOSIS — M86.472 CHRONIC OSTEOMYELITIS WITH DRAINING SINUS, LEFT ANKLE AND FOOT (CMD): ICD-10-CM

## 2020-10-29 DIAGNOSIS — L97.524 SKIN ULCER OF LEFT FOOT WITH NECROSIS OF BONE (CMD): ICD-10-CM

## 2020-10-29 DIAGNOSIS — M86.672 CHRONIC OSTEOMYELITIS OF LEFT FOOT (CMD): ICD-10-CM

## 2020-10-29 DIAGNOSIS — I87.2 VENOUS (PERIPHERAL) INSUFFICIENCY: ICD-10-CM

## 2020-10-29 PROCEDURE — 99213 OFFICE O/P EST LOW 20 MIN: CPT

## 2020-10-29 ASSESSMENT — PAIN SCALES - GENERAL: PAINLEVEL_OUTOF10: 0

## 2020-11-27 ENCOUNTER — MED REC SCAN ONLY (OUTPATIENT)
Dept: FAMILY MEDICINE CLINIC | Facility: CLINIC | Age: 70
End: 2020-11-27

## 2020-12-12 ENCOUNTER — APPOINTMENT (OUTPATIENT)
Dept: GENERAL RADIOLOGY | Facility: HOSPITAL | Age: 70
End: 2020-12-12
Attending: EMERGENCY MEDICINE
Payer: MEDICARE

## 2020-12-12 ENCOUNTER — HOSPITAL ENCOUNTER (EMERGENCY)
Facility: HOSPITAL | Age: 70
Discharge: HOME | End: 2020-12-12
Attending: EMERGENCY MEDICINE
Payer: MEDICARE

## 2020-12-12 VITALS
DIASTOLIC BLOOD PRESSURE: 70 MMHG | SYSTOLIC BLOOD PRESSURE: 142 MMHG | TEMPERATURE: 99 F | OXYGEN SATURATION: 100 % | RESPIRATION RATE: 18 BRPM | HEART RATE: 56 BPM

## 2020-12-12 DIAGNOSIS — U07.1 COVID-19 VIRUS INFECTION: Primary | ICD-10-CM

## 2020-12-12 PROCEDURE — 85025 COMPLETE CBC W/AUTO DIFF WBC: CPT | Performed by: EMERGENCY MEDICINE

## 2020-12-12 PROCEDURE — 71045 X-RAY EXAM CHEST 1 VIEW: CPT | Performed by: EMERGENCY MEDICINE

## 2020-12-12 PROCEDURE — 80053 COMPREHEN METABOLIC PANEL: CPT | Performed by: EMERGENCY MEDICINE

## 2020-12-12 PROCEDURE — 99453 REM MNTR PHYSIOL PARAM SETUP: CPT

## 2020-12-12 PROCEDURE — 99284 EMERGENCY DEPT VISIT MOD MDM: CPT

## 2020-12-12 NOTE — ED INITIAL ASSESSMENT (HPI)
Pt sent by [facility] for antibody infusion pt states he has felt ill for over a week and diagnosed with covid on Thursday

## 2020-12-13 NOTE — ED NOTES
Patient was provided with pulse ox and incentive spirometer  , and instructed on use. Patient verbalized understanding.

## 2020-12-13 NOTE — PATIENT INSTRUCTIONS
Patient Instructions for Home Pulse Oximetry due to COVID-19 Pneumonia  Overview and Terminology  A pulse oximeter a small device that clips on a finger and measures oxygen saturation levels along with your heart rate.  It works by passing small beams of [phone]

## 2020-12-13 NOTE — ED NOTES
Spoke with pt sister Bao Cam who is the POA and states she was aware of pt being sent to er and consents to her brother receiving antibodies

## 2020-12-13 NOTE — ED NOTES
Pt noted to be off cardiac monitor. When RN entered room to evaluate, pt found standing at edge of cart, holding his shirt and hospital gown off. Pt instructed the needed to get back on the cart and pt stated he was going to leave.  Pt noted to appear short

## 2020-12-13 NOTE — ED PROVIDER NOTES
Patient Seen in: [facility] Emergency Department      History   Patient presents with:  Covid  Fatigue    Stated Complaint: + COVID weakness    HPI    66-year-old male presents emergency department from [facility] for possible monoclonal anti Patient is alert and in no acute distress  HEENT: Pupils equal react to light extraocular muscles intact no scleral icterus, mucous membranes are moist, there is no erythema or exudate in the posterior pharynx  Neck: Supple no JVD no lymphadenopathy no men CP angles are sharp. Heart and pulmonary vessels are normal caliber allowing for portable technique. Mediastinal contours are smooth. CONCLUSION:  Minimal interstitial opacities at the left lung base.   This may be any combination of scar, atel items, clean and disinfect “high touch” surfaces, and frequent handwashing) according to CDC guidelines.              Disposition and Plan     Clinical Impression:  COVID-19 virus infection  (primary encounter diagnosis)    Disposition:  Discharge  12/12/20

## 2020-12-18 ENCOUNTER — CLINICAL ABSTRACT (OUTPATIENT)
Dept: WOUND CARE | Age: 70
End: 2020-12-18

## 2022-10-11 RX ORDER — POLYETHYLENE GLYCOL 3350 17 G/17G
17 POWDER, FOR SOLUTION ORAL DAILY PRN
COMMUNITY

## 2022-10-11 RX ORDER — FUROSEMIDE 40 MG/1
40 TABLET ORAL DAILY
COMMUNITY

## 2022-10-11 RX ORDER — TRAZODONE HYDROCHLORIDE 100 MG/1
100 TABLET ORAL NIGHTLY
COMMUNITY

## 2022-10-11 RX ORDER — LISINOPRIL 20 MG/1
20 TABLET ORAL NIGHTLY
COMMUNITY

## 2022-10-11 RX ORDER — ANORECTAL OINTMENT 15.7; .44; 24; 20.6 G/100G; G/100G; G/100G; G/100G
OINTMENT TOPICAL EVERY 4 HOURS PRN
COMMUNITY

## 2022-10-14 ENCOUNTER — HOSPITAL ENCOUNTER (OUTPATIENT)
Facility: HOSPITAL | Age: 72
Setting detail: HOSPITAL OUTPATIENT SURGERY
Discharge: NURSING, REHAB OR LONG-TERM CARE | End: 2022-10-14
Attending: INTERNAL MEDICINE | Admitting: INTERNAL MEDICINE
Payer: MEDICARE

## 2022-10-14 VITALS
OXYGEN SATURATION: 98 % | DIASTOLIC BLOOD PRESSURE: 92 MMHG | HEIGHT: 74 IN | TEMPERATURE: 98 F | SYSTOLIC BLOOD PRESSURE: 156 MMHG | HEART RATE: 65 BPM | BODY MASS INDEX: 28.23 KG/M2 | RESPIRATION RATE: 20 BRPM | WEIGHT: 220 LBS

## 2022-10-14 RX ORDER — SODIUM CHLORIDE, SODIUM LACTATE, POTASSIUM CHLORIDE, CALCIUM CHLORIDE 600; 310; 30; 20 MG/100ML; MG/100ML; MG/100ML; MG/100ML
INJECTION, SOLUTION INTRAVENOUS CONTINUOUS
Status: DISCONTINUED | OUTPATIENT
Start: 2022-10-14 | End: 2022-10-14

## 2022-10-14 NOTE — H&P
[facility]  Pre-procedure History and Physical      Rusty Tafoya Patient Status:  Hospital Outpatient Surgery    3/12/1950 MRN [id number]   Location [address] [address] Attending Debra Mims MD   [facility] Day # 0 PCP France Pelayo MD       10/14/2022    HISTORY OF PRESENT ILLNESS    Rusty Tafoya is a  74year old male who was to have a colonoscopy today for a history of colon polyps and a malignant polyp. He is in a nursing home and was only given 1 glass of Miralax. He is having brown stools. In light of this, his colonoscopy is canceled. He will need to be rescheduled.

## 2023-02-14 RX ORDER — TRIAMCINOLONE ACETONIDE 1 MG/G
CREAM TOPICAL 2 TIMES DAILY
COMMUNITY

## 2023-02-14 RX ORDER — ACETAMINOPHEN 325 MG/1
650 TABLET ORAL EVERY 6 HOURS PRN
COMMUNITY

## 2023-02-14 RX ORDER — HYDROCODONE BITARTRATE AND ACETAMINOPHEN 5; 325 MG/1; MG/1
1 TABLET ORAL EVERY 4 HOURS PRN
COMMUNITY

## 2023-02-21 ENCOUNTER — ANESTHESIA EVENT (OUTPATIENT)
Dept: ENDOSCOPY | Facility: HOSPITAL | Age: 73
End: 2023-02-21
Payer: MEDICARE

## 2023-02-21 ENCOUNTER — ANESTHESIA (OUTPATIENT)
Dept: ENDOSCOPY | Facility: HOSPITAL | Age: 73
End: 2023-02-21
Payer: MEDICARE

## 2023-02-21 ENCOUNTER — HOSPITAL ENCOUNTER (OUTPATIENT)
Facility: HOSPITAL | Age: 73
Setting detail: HOSPITAL OUTPATIENT SURGERY
Discharge: NURSING, REHAB OR LONG-TERM CARE | End: 2023-02-21
Attending: INTERNAL MEDICINE | Admitting: INTERNAL MEDICINE
Payer: MEDICARE

## 2023-02-21 VITALS
BODY MASS INDEX: 28.23 KG/M2 | SYSTOLIC BLOOD PRESSURE: 122 MMHG | TEMPERATURE: 98 F | OXYGEN SATURATION: 96 % | HEART RATE: 53 BPM | HEIGHT: 74 IN | RESPIRATION RATE: 18 BRPM | DIASTOLIC BLOOD PRESSURE: 78 MMHG | WEIGHT: 220 LBS

## 2023-02-21 PROCEDURE — 88305 TISSUE EXAM BY PATHOLOGIST: CPT | Performed by: INTERNAL MEDICINE

## 2023-02-21 PROCEDURE — 0DBM8ZX EXCISION OF DESCENDING COLON, VIA NATURAL OR ARTIFICIAL OPENING ENDOSCOPIC, DIAGNOSTIC: ICD-10-PCS | Performed by: INTERNAL MEDICINE

## 2023-02-21 RX ORDER — NALOXONE HYDROCHLORIDE 0.4 MG/ML
80 INJECTION, SOLUTION INTRAMUSCULAR; INTRAVENOUS; SUBCUTANEOUS AS NEEDED
Status: DISCONTINUED | OUTPATIENT
Start: 2023-02-21 | End: 2023-02-21

## 2023-02-21 RX ORDER — SODIUM CHLORIDE, SODIUM LACTATE, POTASSIUM CHLORIDE, CALCIUM CHLORIDE 600; 310; 30; 20 MG/100ML; MG/100ML; MG/100ML; MG/100ML
INJECTION, SOLUTION INTRAVENOUS CONTINUOUS
Status: DISCONTINUED | OUTPATIENT
Start: 2023-02-21 | End: 2023-02-21

## 2023-02-21 RX ADMIN — SODIUM CHLORIDE, SODIUM LACTATE, POTASSIUM CHLORIDE, CALCIUM CHLORIDE: 600; 310; 30; 20 INJECTION, SOLUTION INTRAVENOUS at 12:19:00

## 2023-02-21 RX ADMIN — SODIUM CHLORIDE, SODIUM LACTATE, POTASSIUM CHLORIDE, CALCIUM CHLORIDE: 600; 310; 30; 20 INJECTION, SOLUTION INTRAVENOUS at 12:39:00

## 2023-02-21 NOTE — ANESTHESIA POSTPROCEDURE EVALUATION
[facility] Patient Status:  Hospital Outpatient Surgery   Age/Gender 74year old male MRN [id number]   Location [address] [address] Attending Avtar Kemp MD   Hosp Day # 0 PCP Brooke Kevin MD       Anesthesia Post-op Note    COLONOSCOPY with cold snare polypectomy    Procedure Summary     Date: 02/21/23 Room / Location: [facility] ENDOSCOPY 03 / [facility] ENDOSCOPY    Anesthesia Start: 9205 Anesthesia Stop:     Procedure: COLONOSCOPY with cold snare polypectomy Diagnosis: (Colon polyp, diverticulosis)    Surgeons: Avtar Kemp MD Anesthesiologist: Emily Nielsen MD    Anesthesia Type: MAC ASA Status: 3          Anesthesia Type: MAC    Vitals Value Taken Time   /73 02/21/23 1239   Temp 98 02/21/23 1239   Pulse 55 02/21/23 1239   Resp 17 02/21/23 1239   SpO2 98 02/21/23 1239       Patient Location: Endoscopy    Anesthesia Type: MAC    Airway Patency: patent    Postop Pain Control: adequate    Mental Status: mildly sedated but able to meaningfully participate in the post-anesthesia evaluation    Nausea/Vomiting: none    Cardiopulmonary/Hydration status: stable euvolemic    Complications: no apparent anesthesia related complications    Postop vital signs: stable    Dental Exam: Unchanged from Preop    Patient to be discharged home when criteria met.

## 2023-02-21 NOTE — PROGRESS NOTES
Acworth SopchoppyMarco # [phone] was called for the post procedure report. Report and discharge papers were given to [facility]).

## (undated) DEVICE — LAWSON - BANDAGE ESMARK STL LF 4INX12FT

## (undated) DEVICE — Device

## (undated) DEVICE — Device: Brand: DEFENDO AIR/WATER/SUCTION AND BIOPSY VALVE

## (undated) DEVICE — 3M™ RED DOT™ MONITORING ELECTRODE WITH FOAM TAPE AND STICKY GEL, 50/BAG, 20/CASE, 72/PLT 2570: Brand: RED DOT™

## (undated) DEVICE — 1200CC GUARDIAN II: Brand: GUARDIAN

## (undated) DEVICE — FILTERLINE NASAL ADULT O2/CO2

## (undated) DEVICE — LAWSON - CONTAINER SPCMN STL 5OZ

## (undated) DEVICE — TRAP SPEC REMOVAL ETRAP 15CM

## (undated) DEVICE — ENDOSCOPY PACK - LOWER: Brand: MEDLINE INDUSTRIES, INC.

## (undated) DEVICE — SNARE 9MM 230CM 2.4MM EXACTO

## (undated) DEVICE — LAWSON - BUR OVAL CBD 4MM P/S

## (undated) NOTE — LETTER
Patient Name: Lester Morton  Surgery Date: 10/14/2022  CSN: [id number]  Medical Record: [id number]     Surgeon(s):  Suzi Sandoval MD  Consent Procedure: COLONOSCOPY  Anesthesia Type: MAC    Please fax face sheet and medical list to Darya Kelly at [facility].     Fax # [phone]    Thank you,  Glenda Lamb RN